# Patient Record
Sex: FEMALE | Race: WHITE | NOT HISPANIC OR LATINO | Employment: FULL TIME | URBAN - METROPOLITAN AREA
[De-identification: names, ages, dates, MRNs, and addresses within clinical notes are randomized per-mention and may not be internally consistent; named-entity substitution may affect disease eponyms.]

---

## 2017-06-04 ENCOUNTER — OFFICE VISIT (OUTPATIENT)
Dept: URGENT CARE | Facility: URGENT CARE | Age: 47
End: 2017-06-04
Payer: COMMERCIAL

## 2017-06-04 VITALS
HEART RATE: 79 BPM | OXYGEN SATURATION: 96 % | BODY MASS INDEX: 17.93 KG/M2 | RESPIRATION RATE: 15 BRPM | TEMPERATURE: 98.5 F | HEIGHT: 64 IN | DIASTOLIC BLOOD PRESSURE: 56 MMHG | WEIGHT: 105 LBS | SYSTOLIC BLOOD PRESSURE: 90 MMHG
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
## 2017-06-04 PROCEDURE — 99213 OFFICE O/P EST LOW 20 MIN: CPT | Performed by: PHYSICIAN ASSISTANT

## 2017-06-04 NOTE — PROGRESS NOTES
"CHIEF COMPLAINT:   Chief Complaint   Patient presents with     Urgent Care     Cat Bite     bit by her cat last night when she accidentally stepped on it. Left ankle/foot. A lot of pain last night.        HPI: Tasneem Mccollum is a 46 year old female who presents to clinic today for evaluation of a cat bite and scratch. Patient states that last night she accidentally stepped on her cat and her cat scratched the back of her ankle and one of the cats teeth penetrated to the top of the foot. She was in a lot of pain last night, which she states has improved after taking IBU. The cat is UTD on vaccinations. Patients last tetanus was in 2008.    Past Medical History:   Diagnosis Date     Bone disorder     Low bone mass for age     Menarche 15 years old     Past Surgical History:   Procedure Laterality Date     LEEP TX, CERVICAL  1997     Social History   Substance Use Topics     Smoking status: Never Smoker     Smokeless tobacco: Never Used     Alcohol use Yes      Comment: occ     Current Outpatient Prescriptions   Medication     amoxicillin-clavulanate (AUGMENTIN) 875-125 MG per tablet     guaiFENesin-codeine (ROBITUSSIN AC) 100-10 MG/5ML SOLN solution     fluticasone (FLONASE) 50 MCG/ACT nasal spray     Pseudoephedrine HCl (SUDAFED PO)     acetaminophen (TYLENOL) 325 MG tablet     Ibuprofen (ADVIL) 200 MG capsule     No current facility-administered medications for this visit.      Allergies   Allergen Reactions     Dust Mites      Nkda [No Known Drug Allergies]        10 point ROS of systems including Constitutional, Eyes, Respiratory, Cardiovascular, Gastroenterology, Genitourinary, Integumentary, Muscularskeletal, Psychiatric were all negative except for pertinent positives noted in my HPI.        Exam:  BP 90/56  Pulse 79  Temp 98.5  F (36.9  C) (Oral)  Resp 15  Ht 5' 4\" (1.626 m)  Wt 105 lb (47.6 kg)  LMP 05/14/2017  SpO2 96%  BMI 18.02 kg/m2  Constitutional: healthy, alert and no distress  Head: " Normocephalic, atraumatic.  M/S: Strength intact with FROM.   Skin: Two superficial scratches noted at the posterior ankle without redness, swelling, tenderness or drainage. One ~1mm bite noted on dorsal aspect of foot without erythema, swelling. TTP.   Neurologic: Speech clear, gait normal. Moves all extremities.        ASSESSMENT/PLAN:    ICD-10-CM    1. Cat bite, initial encounter W55.01XA amoxicillin-clavulanate (AUGMENTIN) 875-125 MG per tablet       I have discussed the patient's diagnosis and my plan of treatment with the patient. We went over any labs or imaging. Patient is aware to come back in with worsening symptoms or if no relief despite treatment plan.  Patient verbalizes understanding. All questions were addressed and answered.   Kaylie Adams PA-C

## 2017-06-04 NOTE — PATIENT INSTRUCTIONS
Follow up with Dr. Mugnuia if symptoms worsen or redness / drainage appear.   Cat Bite  A cat bite can cause a wound deep enough to break the skin. In such cases, the wound is cleaned and then closed. Sometimes, the wound is not closed completely. This is so that fluid can drain if the wound becomes infected. In addition to wound care, a tetanus shot may be given, if needed.  Home Care    Wash your hands well with soap and warm water before and after caring for the wound. This helps lower the risk of infection.    Care for the wound as directed. If a dressing was applied to the wound, be sure to change it as directed.    If the wound bleeds, place a clean, soft cloth on the wound. Then firmly apply pressure until the bleeding stops. This may take up to 5 minutes. Do not release the pressure and look at the wound during this time.    Most wounds heal within 10 days. But an infection can occur even with proper treatment. So be sure to check the wound daily for signs of infection (see below).    Antibiotics may be prescribed. These help prevent or treat infection. If you re given antibiotics, take them as directed. Also be sure to complete the medications.  Rabies Prevention   Rabies is a virus that can be carried in certain animals. These can include domestic animals such as cats and dogs. Pets fully vaccinated against rabies (2 shots) are at very low risk of infection. But because human rabies is almost always fatal, any biting pet should be confined for 10 days as an extra precaution. In general, if there is a risk for rabies, the following steps may need to be taken:    If someone s pet cat has bitten you, it should be kept in a secure area for the next 10 days to watch for signs of illness. (If the pet owner won t allow this, contact your local animal control center.) If the cat becomes ill or dies during that time, contact your local animal control center at once so the animal may be tested for rabies. If the  cat stays healthy for the next 10 days, there is no danger of rabies in the animal or you.    If a stray cat bit you, contact your local animal control center. They can give information on capture, quarantine, and animal rabies testing.    If you can t locate the animal that bit you in the next 2 days, and if rabies exists in your region, you may need to receive the rabies vaccine series. Call your health care provider right away. Or, return to the emergency department promptly.    All animal bites should be reported to the local animal control center. If you were not given a form to fill out, you can report this yourself.  Follow-up care  Follow up with your health care provider, or as directed.  When to seek medical advice  Call your health care provider right away if any of these occur:    Signs of infection:    Spreading redness or warmth from the wound    Increased pain or swelling    Fever of 100.4 F (38 C) or higher, or as directed by your health care provider    Colored fluid or pus draining from the wound    Signs of rabies infection:    Headache    Confusion    Strange behavior    Seizure    Decreased ability to move any body part near the bite area    Bleeding that cannot be stopped after 5 minutes of firm pressure              8295-7032 [employer] [address] [place] [address], [place] [address]. All rights reserved. This information is not intended as a substitute for professional medical care. Always follow your healthcare professional's instructions.

## 2017-06-04 NOTE — NURSING NOTE
"Chief Complaint   Patient presents with     Urgent Care     Cat Bite     bit by her cat last night when she accidentally stepped on it. Left ankle/foot. A lot of pain last night.        Initial BP 90/56  Pulse 79  Temp 98.5  F (36.9  C) (Oral)  Resp 15  Ht 5' 4\" (1.626 m)  Wt 105 lb (47.6 kg)  LMP 05/14/2017  SpO2 96%  BMI 18.02 kg/m2 Estimated body mass index is 18.02 kg/(m^2) as calculated from the following:    Height as of this encounter: 5' 4\" (1.626 m).    Weight as of this encounter: 105 lb (47.6 kg).  Medication Reconciliation: complete  "

## 2017-06-04 NOTE — MR AVS SNAPSHOT
After Visit Summary   6/4/2017    Tasneem Mccollum    MRN: [id number]           Patient Information     Date Of Birth          1970        Visit Information        Provider Department      6/4/2017 11:50 AM Kaylie Adams PA-C [facility]        Today's Diagnoses     Cat bite, initial encounter    -  1      Care Instructions      Follow up with Dr. Munguia if symptoms worsen or redness / drainage appear.   Cat Bite  A cat bite can cause a wound deep enough to break the skin. In such cases, the wound is cleaned and then closed. Sometimes, the wound is not closed completely. This is so that fluid can drain if the wound becomes infected. In addition to wound care, a tetanus shot may be given, if needed.  Home Care    Wash your hands well with soap and warm water before and after caring for the wound. This helps lower the risk of infection.    Care for the wound as directed. If a dressing was applied to the wound, be sure to change it as directed.    If the wound bleeds, place a clean, soft cloth on the wound. Then firmly apply pressure until the bleeding stops. This may take up to 5 minutes. Do not release the pressure and look at the wound during this time.    Most wounds heal within 10 days. But an infection can occur even with proper treatment. So be sure to check the wound daily for signs of infection (see below).    Antibiotics may be prescribed. These help prevent or treat infection. If you re given antibiotics, take them as directed. Also be sure to complete the medications.  Rabies Prevention   Rabies is a virus that can be carried in certain animals. These can include domestic animals such as cats and dogs. Pets fully vaccinated against rabies (2 shots) are at very low risk of infection. But because human rabies is almost always fatal, any biting pet should be confined for 10 days as an extra precaution. In general, if there is a risk for rabies, the  following steps may need to be taken:    If someone s pet cat has bitten you, it should be kept in a secure area for the next 10 days to watch for signs of illness. (If the pet owner won t allow this, contact your local animal control center.) If the cat becomes ill or dies during that time, contact your local animal control center at once so the animal may be tested for rabies. If the cat stays healthy for the next 10 days, there is no danger of rabies in the animal or you.    If a stray cat bit you, contact your local animal control center. They can give information on capture, quarantine, and animal rabies testing.    If you can t locate the animal that bit you in the next 2 days, and if rabies exists in your region, you may need to receive the rabies vaccine series. Call your health care provider right away. Or, return to the emergency department promptly.    All animal bites should be reported to the local animal control center. If you were not given a form to fill out, you can report this yourself.  Follow-up care  Follow up with your health care provider, or as directed.  When to seek medical advice  Call your health care provider right away if any of these occur:    Signs of infection:    Spreading redness or warmth from the wound    Increased pain or swelling    Fever of 100.4 F (38 C) or higher, or as directed by your health care provider    Colored fluid or pus draining from the wound    Signs of rabies infection:    Headache    Confusion    Strange behavior    Seizure    Decreased ability to move any body part near the bite area    Bleeding that cannot be stopped after 5 minutes of firm pressure              [id number] [employer]. [address] [place] [address], [place] [id number]. All rights reserved. This information is not intended as a substitute for professional medical care. Always follow your healthcare professional's instructions.                Follow-ups after your visit        Who to  "contact     If you have questions or need follow up information about today's clinic visit or your schedule please contact [facility] directly at [phone].  Normal or non-critical lab and imaging results will be communicated to you by [employer], letter or phone within 4 business days after the clinic has received the results. If you do not hear from us within 7 days, please contact the clinic through [employer] or phone. If you have a critical or abnormal lab result, we will notify you by phone as soon as possible.  Submit refill requests through [employer] or call your pharmacy and they will forward the refill request to us. Please allow 3 business days for your refill to be completed.          Additional Information About Your Visit        [employer] Information     [employer] gives you secure access to your electronic health record. If you see a primary care provider, you can also send messages to your care team and make appointments. If you have questions, please call your primary care clinic.  If you do not have a primary care provider, please call [phone] and they will assist you.        Care EveryWhere ID     This is your Care EveryWhere ID. This could be used by other organizations to access your Nursery medical records  [id number]        Your Vitals Were     Pulse Temperature Respirations Height Last Period Pulse Oximetry    79 98.5  F (36.9  C) (Oral) 15 5' 4\" (1.626 m) 05/14/2017 96%    BMI (Body Mass Index)                   18.02 kg/m2            Blood Pressure from Last 3 Encounters:   06/04/17 90/56   12/17/16 112/78   12/09/16 108/60    Weight from Last 3 Encounters:   06/04/17 105 lb (47.6 kg)   12/09/16 110 lb (49.9 kg)   12/18/15 108 lb (49 kg)              Today, you had the following     No orders found for display         Today's Medication Changes          These changes are accurate as of: 6/4/17 12:47 PM.  If you have any questions, ask your nurse or doctor.             "   Start taking these medicines.        Dose/Directions    amoxicillin-clavulanate 875-125 MG per tablet   Commonly known as:  AUGMENTIN   Used for:  Cat bite, initial encounter   Started by:  Kaylie Adams PA-C        Dose:  1 tablet   Take 1 tablet by mouth 2 times daily for 5 days   Quantity:  10 tablet   Refills:  0            Where to get your medicines      These medications were sent to [id number] - [place], [place] - [address] AT [facility]  [address], [place] [address]     Phone:  [phone]     amoxicillin-clavulanate 875-125 MG per tablet                Primary Care Provider Office Phone # Fax #    Felipe Catarino Munguia [phone]       [address] [address]        Thank you!     Thank you for choosing [facility]  for your care. Our goal is always to provide you with excellent care. Hearing back from our patients is one way we can continue to improve our services. Please take a few minutes to complete the written survey that you may receive in the mail after your visit with us. Thank you!             Your Updated Medication List - Protect others around you: Learn how to safely use, store and throw away your medicines at www.disposemymeds.org.          This list is accurate as of: 6/4/17 12:47 PM.  Always use your most recent med list.                   Brand Name Dispense Instructions for use    ADVIL 200 MG capsule   Generic drug:  ibuprofen      Take 200 mg by mouth every 4 hours as needed.       amoxicillin-clavulanate 875-125 MG per tablet    AUGMENTIN    10 tablet    Take 1 tablet by mouth 2 times daily for 5 days       fluticasone 50 MCG/ACT spray    FLONASE    16 g    Spray 1-2 sprays into both nostrils daily Should be seen before further refill.       guaiFENesin-codeine 100-10 MG/5ML Soln solution    ROBITUSSIN AC    120 mL    Take 10 mLs by mouth every 4 hours  as needed for cough       SUDAFED PO          TYLENOL 325 MG tablet   Generic drug:  acetaminophen      Take 1-2 tablets by mouth every 6 hours as needed. As needed .

## 2017-07-01 ENCOUNTER — VIRTUAL VISIT (OUTPATIENT)
Dept: FAMILY MEDICINE | Facility: OTHER | Age: 47
End: 2017-07-01

## 2017-07-01 NOTE — PROGRESS NOTES
"Date:   Clinician: Rupesh Verduzco  Clinician NPI: [id number]  Patient: Tasneem Mccollum  Patient : 1970  Patient Address: [facility], [place] [address]  Patient Phone: [phone]  Visit Protocol: URI  Patient Summary:  Tasneem is a 47 year old ( : 1970 ) female who initiated a Visit for a presumed sinus infection. When asked the question \"Please sign me up to receive news, health information and promotions. \", Tasneem responded \"No\".     Her  symptoms started gradually 2 weeks ago  and consist of rhinitis, hoarse voice, nasal congestion, post-nasal drainage, malaise, chills, loss of appetite, and cough.   She denies headache, fever, dysphagia, itchy eyes, myalgias, chest pain, ear pain, sore throat, dyspnea, nausea, and vomiting. She denies a history of facial surgery.   Her profuse nasal secretions are yellow. Her moderate facial pain or pressure feels worse when bending over or leaning forward and is located on one side of her head. The facial pain or pressure started after the onset of other URI symptoms.  She has teeth pain and is confident the tooth pain is not from a cavity, recent dental work or other mouth problems. Tasneem does not have a history of migraine headaches.   In the past year Tasneem has been diagnosed with one (1) episodes of sinusitis. When asked to feel her neck she reported enlarged lymph nodes. Tasneem noted that the enlarged neck lymph nodes developed AFTER the onset of upper respiratory symptoms. She cannot tell if axillary lymphadenopathy is present.   Her moderately severe (cough every 5-10 minutes) productive cough is more bothersome at night. She believes the cough is not caused by post-nasal drainage. Her cough produces unknown color of sputum.   She denies rigors.   Tasneem denies having COPD or other chronic lung disease.   Pulse: Not measured beats in 10 seconds.    Weight (in lbs): 105   She states she is not pregnant and denies breastfeeding. She is currently " menstruating.   Tasneem does not smoke or use smokeless tobacco.   Additional information as reported by the patient (free text): This started with severe pain on left side inner ear/ eustachian tube (adenoid?)  when I swallowed. On June 17th. I [place] and they did a strep test which was negative. The doctor said I had swollen glands and probably a virus.On June 23rd the pain was less but I was congested with a totally plugged left ear. It has been plugged ever since for 9 days! I am very congested. I had lots of drainage down my throat that started the cough. I'm dizzy moving around-and have no energ   MEDICATIONS:  Pseudoephedrine (Sudafed, Dimetapp), oxymetazoline (Afrin, Dristan), ibuprofen (Advil, Motrin), and loratadine (Claritin, Tavist ND)  , ALLERGIES:  NKDA   Clinician Response:  Dear Tasneem,  Based on the information you have provided, you likely have  acute sinusitis, otherwise known as a sinus infection.   I am prescribing fluticasone propionate nasal (Flonase) 50 mcg/spray. Take one or two inhalations in each nostril one time a day. There are no refills with this prescription.   I am prescribing amoxicillin 500 mg. Take two tablets by mouth three times a day for 10 days. There are no refills with this prescription.   Unless your are allergic to the over-the-counter medication(s) below, I recommend using:   A sinus irrigation kit such as Sinus Rinse, Neti Pot, SinuCleanse (or store brand). Be sure to use sterile or previously boiled water to prevent unwanted infections.   Guaifenesin + dextromethorphan (Robitussin DM, Mucinex DM). This is an over-the-counter medication that does not require a prescription.   A decongestant such as pseudoephedrine (Sudafed or store brand) to help your symptoms. This is an over-the-counter medication that does not require a prescription.   Ibuprofen. Take 1-3 200mg tablets (200-600mg) every 8 hours to help with the discomfort. Make sure to take the  ibuprofen with food. Do not exceed 2400mg in 24 hours. This is an over-the-counter medication that does not require a prescription.   Drink plenty of liquids, especially water and take time to rest your body. This may mean taking a nap or going to bed earlier. Your body is fighting an infection and liquids and rest will improve the pace of recovery. Remember to regularly wash your hands and avoid close contact with others to prevent spreading your infection.   Some people develop allergies to antibiotics. If you notice a new rash, significant swelling or difficulty breathing, stop the medication immediately and go into a clinic for physical evaluation.   Some women may also develop a yeast infection as a side effect of taking antibiotics. If you notice symptoms of a yeast infection, please use [facility] to get treatment.   If you become pregnant during this course of treatment with medication, stop taking the medication and contact your primary care provider.   Diagnosis: Acute Sinusitis  Diagnosis ICD: J01.90  Prescription: fluticasone propionate (Flonase) 50mcg nasal spray 16 gm, 30 days supply. Take one or two inhalations in each nostril one time a day. Refills: 0, Refill as needed: no, Allow substitutions: yes  Prescription: amoxicillin 500mg oral tablet 60 tablets, 10 days supply. Take two tablets by mouth three times a day for 10 days. Refills: 0, Refill as needed: no, Allow substitutions: yes  Pharmacy: [id number] - [phone] - [address] [place] [address]

## 2018-10-22 ENCOUNTER — OFFICE VISIT (OUTPATIENT)
Dept: URGENT CARE | Facility: URGENT CARE | Age: 48
End: 2018-10-22
Payer: COMMERCIAL

## 2018-10-22 VITALS
TEMPERATURE: 98.3 F | OXYGEN SATURATION: 99 % | WEIGHT: 100 LBS | HEART RATE: 91 BPM | SYSTOLIC BLOOD PRESSURE: 121 MMHG | BODY MASS INDEX: 17.07 KG/M2 | DIASTOLIC BLOOD PRESSURE: 81 MMHG | HEIGHT: 64 IN

## 2018-10-22 DIAGNOSIS — R09.81 SINUS CONGESTION: Primary | ICD-10-CM

## 2018-10-22 DIAGNOSIS — J34.89 SINUS PRESSURE: ICD-10-CM

## 2018-10-22 PROCEDURE — 99213 OFFICE O/P EST LOW 20 MIN: CPT | Performed by: PEDIATRICS

## 2018-10-22 RX ORDER — FLUTICASONE PROPIONATE 50 MCG
1-2 SPRAY, SUSPENSION (ML) NASAL DAILY
Qty: 16 G | Refills: 0 | Status: SHIPPED | OUTPATIENT
Start: 2018-10-22

## 2018-10-22 RX ORDER — CETIRIZINE HYDROCHLORIDE 10 MG/1
10 TABLET ORAL EVERY EVENING
Qty: 30 TABLET | Refills: 0 | Status: SHIPPED | OUTPATIENT
Start: 2018-10-22 | End: 2023-12-28

## 2018-10-22 NOTE — MR AVS SNAPSHOT
After Visit Summary   10/22/2018    Tasneem Mccollum    MRN: [id number]           Patient Information     Date Of Birth          1970        Visit Information        Provider Department      10/22/2018 6:20 PM Rupesh Carrillo MD [facility]        Today's Diagnoses     Sinus congestion    -  1    Sinus pressure           Follow-ups after your visit        Your next 10 appointments already scheduled     Nov 30, 2018  3:00 PM CST   Office Visit with Melvi Kinney MD   [facility] ([facility])    [address] [place] [id number] [phone]           Bring a current list of meds and any records pertaining to this visit. For Physicals, please bring immunization records and any forms needing to be filled out. Please arrive 10 minutes early to complete paperwork.              Who to contact     If you have questions or need follow up information about today's clinic visit or your schedule please contact [facility] directly at [phone].  Normal or non-critical lab and imaging results will be communicated to you by [employer], letter or phone within 4 business days after the clinic has received the results. If you do not hear from us within 7 days, please contact the clinic through [employer] or phone. If you have a critical or abnormal lab result, we will notify you by phone as soon as possible.  Submit refill requests through [employer] or call your pharmacy and they will forward the refill request to us. Please allow 3 business days for your refill to be completed.          Additional Information About Your Visit        [employer] Information     [employer] gives you secure access to your electronic health record. If you see a primary care provider, you can also send messages to your care team and make appointments. If you have questions, please call your primary care clinic.  If you do not have  "a primary care provider, please call [phone] and they will assist you.        Care EveryWhere ID     This is your Care EveryWhere ID. This could be used by other organizations to access your [facility] medical records  [id number]        Your Vitals Were     Pulse Temperature Height Last Period Pulse Oximetry BMI (Body Mass Index)    91 98.3  F (36.8  C) (Tympanic) 5' 4\" (1.626 m) 10/01/2018 99% 17.16 kg/m2       Blood Pressure from Last 3 Encounters:   10/22/18 121/81   06/04/17 90/56   12/17/16 112/78    Weight from Last 3 Encounters:   10/22/18 100 lb (45.4 kg)   06/04/17 105 lb (47.6 kg)   12/09/16 110 lb (49.9 kg)              Today, you had the following     No orders found for display         Today's Medication Changes          These changes are accurate as of 10/22/18  7:08 PM.  If you have any questions, ask your nurse or doctor.               Start taking these medicines.        Dose/Directions    cetirizine 10 MG tablet   Commonly known as:  zyrTEC   Used for:  Sinus congestion   Started by:  Rupesh Carrillo MD        Dose:  10 mg   Take 1 tablet (10 mg) by mouth every evening   Quantity:  30 tablet   Refills:  0            Where to get your medicines      These medications were sent to [id number] - [place], [place] - [address] AT [facility]  [place]  [address] [place] [address]     Phone:  [phone]     cetirizine 10 MG tablet    fluticasone 50 MCG/ACT spray                Primary Care Provider Office Phone # Fax #    Felipe Catarino Munguia [phone] [address]  [facility] [address]        Equal Access to Services     LINN CLINTON AH: Lorena Sousa, lori quiroga, yandel kahernan molina. So [facility] [phone].    ATENCIÓN: Si habla español, tiene a saha disposición servicios gratuitos de asistencia lingüística. Kade atkins [phone].    We comply with applicable federal civil " rights laws and [place] laws. We do not discriminate on the basis of race, color, national origin, age, disability, sex, sexual orientation, or gender identity.            Thank you!     Thank you for choosing [facility]  for your care. Our goal is always to provide you with excellent care. Hearing back from our patients is one way we can continue to improve our services. Please take a few minutes to complete the written survey that you may receive in the mail after your visit with us. Thank you!             Your Updated Medication List - Protect others around you: Learn how to safely use, store and throw away your medicines at www.disposemymeds.org.          This list is accurate as of 10/22/18  7:08 PM.  Always use your most recent med list.                   Brand Name Dispense Instructions for use Diagnosis    ADVIL 200 MG capsule   Generic drug:  ibuprofen      Take 200 mg by mouth every 4 hours as needed.        cetirizine 10 MG tablet    zyrTEC    30 tablet    Take 1 tablet (10 mg) by mouth every evening    Sinus congestion       fluticasone 50 MCG/ACT spray    FLONASE    16 g    Spray 1-2 sprays into both nostrils daily Should be seen before further refill.    Sinus pressure       guaiFENesin-codeine 100-10 MG/5ML Soln solution    ROBITUSSIN AC    120 mL    Take 10 mLs by mouth every 4 hours as needed for cough    Cough       SUDAFED PO           TYLENOL 325 MG tablet   Generic drug:  acetaminophen      Take 1-2 tablets by mouth every 6 hours as needed. As needed .

## 2018-10-23 NOTE — PROGRESS NOTES
"SUBJECTIVE:  Tasneem PRITCHARD Devorah Mccollum is a 48 year old female here with concerns about sinus congestion.  She states onset of symptoms were 1 month(s) ago.  She has had maxillary pressure. Course of illness is same. Severity mild  Current and Associated symptoms: No fever, rhinorrhea, cough, sore throat, ear pain. Some headache.  Predisposing factors include seasonal allergies. Recent treatment has included: OTC meds and Steroid nasal spray    Past Medical History:   Diagnosis Date     Bone disorder     Low bone mass for age     Menarche 15 years old     Social History   Substance Use Topics     Smoking status: Never Smoker     Smokeless tobacco: Never Used     Alcohol use Yes      Comment: occ       ROS:  CONSTITUTIONAL:NEGATIVE for fever, chills, change in weight  INTEGUMENTARY/SKIN: NEGATIVE for worrisome rashes, moles or lesions  RESP:NEGATIVE for significant cough or SOB  CV: NEGATIVE for chest pain, palpitations or peripheral edema  GI: NEGATIVE for nausea, abdominal pain, heartburn, or change in bowel habits    OBJECTIVE:  /81  Pulse 91  Temp 98.3  F (36.8  C) (Tympanic)  Ht 5' 4\" (1.626 m)  Wt 100 lb (45.4 kg)  LMP 10/01/2018  SpO2 99%  BMI 17.16 kg/m2  Exam:GENERAL APPEARANCE: healthy, alert and no distress  EYES: EOMI,  PERRL, conjunctiva clear  HENT: ear canals and TM's normal.  Nose and mouth without ulcers, erythema or lesions  RESP: lungs clear to auscultation - no rales, rhonchi or wheezes  SKIN: no suspicious lesions or rashes    ASSESSMENT:  Seasonal allergies  See diagnosis    PLAN:  See orders  Follow up with primary clinic if not improving    "

## 2019-10-01 ENCOUNTER — HEALTH MAINTENANCE LETTER (OUTPATIENT)
Age: 49
End: 2019-10-01

## 2019-12-19 ENCOUNTER — VIRTUAL VISIT (OUTPATIENT)
Dept: FAMILY MEDICINE | Facility: OTHER | Age: 49
End: 2019-12-19

## 2019-12-19 NOTE — PROGRESS NOTES
"Date: 2019 14:36:36  Clinician: Joel Anglin  Clinician NPI: [id number]  Patient: Tasneem Mccollum  Patient : 1970  Patient Address: [address] [place] [address]  Patient Phone: [phone]  Visit Protocol: URI  Patient Summary:  Tasneem is a 49 year old ( : 1970 ) female who initiated a Visit for cold, sinus infection, or influenza. When asked the question \"Please sign me up to receive news, health information and promotions. \", Tasneem responded \"No\".    Tasneem states her symptoms started gradually 2-3 weeks ago. After her symptoms started, they improved and then got worse again.   Her symptoms consist of a headache, nasal congestion, malaise, rhinitis, tooth pain, a cough, and facial pain or pressure. She is experiencing difficulty breathing due to nasal congestion but she is not short of breath.   Symptom details     Nasal secretions: The color of her mucus is yellow.    Cough: Tasneem coughs every 5-10 minutes and her cough is more bothersome at night. Phlegm comes into her throat when she coughs. She believes the phlegm causes the cough. The color of the phlegm is clear.     Facial pain or pressure: The facial pain or pressure feels worse when bending over or leaning forward.     Headache: She states the headache is severe (7-9 on a 10 point pain scale).     Tooth pain: The tooth pain is not caused by a cavity, recent dental work, or other mouth problems.      Tasneem denies having sore throat, ear pain, chills, fever, wheezing, and myalgias. She also denies taking antibiotic medication for the symptoms, having recent facial or sinus surgery in the past 60 days, and having a sinus infection within the past year.   Precipitating events  She has not recently been exposed to someone with influenza. Tasneem has not been in close contact with any high risk individuals.   Pertinent medical history  Tasneem does not get yeast infections when she takes antibiotics.   Weight: 105 lbs   Tasneem does not smoke " or use smokeless tobacco.   She denies pregnancy and denies breastfeeding. She is currently menstruating.   Weight: 105 lbs    MEDICATIONS: Advil oral, ALLERGIES: pseudoephedrine  Clinician Response:  Dear Tasneem,  Based on the information provided, you have acute bacterial sinusitis, also known as a sinus infection. Sinus infections are caused by bacteria or a virus and symptoms are almost always identical. The difference between the 2 types of infections is timing.  Sinus infections start as viral infections and symptoms improve on their own in about 7 days. If symptoms have not improved after 7 days or have even worsened, a bacterial infection may have developed.  Medication information  I am prescribing:       Azithromycin (Zithromax Z-Moisés) 250 mg oral tablet. Take 2 tablets by mouth on day 1, then 1 tablet by mouth on days 2-5. There are no refills with this prescription.      Benzonatate (Tessalon Perles) 100 mg oral capsule. Take 1-2 capsules by mouth 3 times per day as needed for your cough. There are no refills with this prescription.     Yeast infections can be a common side effect of antibiotics. The most common symptom of a yeast infection is itchiness in and around the vagina. Other signs and symptoms include burning, redness, or a thick, white vaginal discharge that looks like cottage cheese and does not have a bad smell.  If you become pregnant during this course of treatment, stop taking the medication and contact your primary care provider.  Self care  The following tips will keep you as comfortable as possible while you recover:     Rest    Drink plenty of water and other liquids    Take a hot shower to loosen congestion    Take a spoonful of honey to reduce your cough     When to seek care  Please be seen in a clinic or urgent care if any of the following occur:     Symptoms do not start to improve after 3 days of treatment    New symptoms develop, or symptoms become worse     It is possible to  have an allergic reaction to an antibiotic even if you have not had one in the past. If you notice a new rash, significant swelling, or difficulty breathing, stop taking this medication immediately and go to a clinic or urgent care.   Diagnosis: Acute bacterial sinusitis  Diagnosis ICD: J01.90  Prescription: azithromycin (Zithromax Z-Moisés) 250 mg oral tablet 6 tablet, 5 days supply. Take 2 tablets by mouth on day 1, then 1 tablet by mouth on days 2-5. Refills: 0, Refill as needed: no, Allow substitutions: yes  Prescription: benzonatate (Tessalon Perles) 100 mg oral capsule 30 capsule, 5 days supply. Take 1-2 capsules by mouth 3 times per day as needed. Refills: 0, Refill as needed: no, Allow substitutions: yes  Pharmacy: [facility]  [phone] - [address] [place] [id number]

## 2020-01-15 ENCOUNTER — OFFICE VISIT (OUTPATIENT)
Dept: FAMILY MEDICINE | Facility: CLINIC | Age: 50
End: 2020-01-15
Payer: COMMERCIAL

## 2020-01-15 VITALS
RESPIRATION RATE: 16 BRPM | BODY MASS INDEX: 18.57 KG/M2 | WEIGHT: 108.75 LBS | HEART RATE: 90 BPM | TEMPERATURE: 98.3 F | SYSTOLIC BLOOD PRESSURE: 108 MMHG | DIASTOLIC BLOOD PRESSURE: 72 MMHG | HEIGHT: 64 IN | OXYGEN SATURATION: 99 %

## 2020-01-15 DIAGNOSIS — J01.01 ACUTE RECURRENT MAXILLARY SINUSITIS: Primary | ICD-10-CM

## 2020-01-15 PROCEDURE — 90471 IMMUNIZATION ADMIN: CPT | Performed by: FAMILY MEDICINE

## 2020-01-15 PROCEDURE — 90686 IIV4 VACC NO PRSV 0.5 ML IM: CPT | Performed by: FAMILY MEDICINE

## 2020-01-15 PROCEDURE — 99213 OFFICE O/P EST LOW 20 MIN: CPT | Mod: 25 | Performed by: FAMILY MEDICINE

## 2020-01-15 ASSESSMENT — MIFFLIN-ST. JEOR: SCORE: 1095.35

## 2020-01-15 NOTE — PROGRESS NOTES
"Subjective     Tasneem FRANCHESKA Mccollum is a 49 year old female who presents to clinic today for the following health issues:    HPI   Ear problem      Duration: had URI sx, but has gone away now.     Description (location/character/radiation): left ear plugged, occasional pain radiating down throat, muffled hearing, fatigue    Intensity:  mild    Accompanying signs and symptoms: pain and discomfort on right side of rib.     History (similar episodes/previous evaluation): None    Precipitating or alleviating factors: None    Therapies tried and outcome: sudafed and cleaning out ears in shower, humidifier and ibuprofen, had z-zach for uri sx     Did oncare and got zpack.    Left side of head, left ear muffled.   Balance is dizzy.   Pressure on left side.   Had some coughing but now better. Still lungs are not back to baseline.   zpack helpes but did not make it go away completely.   In the morning - some stomach discomfort.       Social History     Occupational History     Not on file   Tobacco Use     Smoking status: Never Smoker     Smokeless tobacco: Never Used   Substance and Sexual Activity     Alcohol use: Yes     Comment: occ     Drug use: No     Sexual activity: Yes     Partners: Male     Birth control/protection: Condom, Spermicide     Comment: spermicidal/upcoming vasectomy     Allergies   Allergen Reactions     Dust Mites      Nkda [No Known Drug Allergies]      Patient Active Problem List   Diagnosis     CARDIOVASCULAR SCREENING; LDL GOAL LESS THAN 160     S/P LEEP of cervix     Seafood allergy     Cyst of left ovary     Reviewed medications, social history and  past medical and surgical history.    Review of system: for general, respiratory, CVS, GI and psychiatry negative except for noted above.     EXAM:  /72 (BP Location: Left arm, Patient Position: Sitting, Cuff Size: Adult Regular)   Pulse 90   Temp 98.3  F (36.8  C) (Oral)   Resp 16   Ht 1.613 m (5' 3.5\")   Wt 49.3 kg (108 lb 12 oz)   LMP " 01/11/2020 (Approximate)   SpO2 99%   BMI 18.96 kg/m    Constitutional: healthy, alert and no distress   Psychiatric: mentation appears normal and affect normal/bright  Cardiovascular: RRR. No murmurs,  Respiratory: negative, Lungs clear. No crackles or wheezing. No tachypnea.   Head: Left more than the right frontal and maxillary sinus tenderness.  ENT: Both TM exam normal, minimal cervical adenopathy, throat clear    ASSESSMENT / PLAN:  (J01.01) Acute recurrent maxillary sinusitis  (primary encounter diagnosis)  Comment: Was treated  Via oncare with Z-Moisés.  Symptoms did not improve significantly.  We will do Augmentin.  She has tolerated very well in the past.  She understands diarrhea can be side effects.   .  Home care discussed.  Plan: amoxicillin-clavulanate (AUGMENTIN) 875-125 MG         tablet          The above note was dictated using voice recognition. Although reviewed after completion, some word and grammatical error may remain .

## 2020-01-23 ENCOUNTER — TELEPHONE (OUTPATIENT)
Dept: FAMILY MEDICINE | Facility: CLINIC | Age: 50
End: 2020-01-23

## 2020-01-23 NOTE — TELEPHONE ENCOUNTER
Called pt new to [facility], is on schedule for Subconjunctival hemorrhage     Spoke with pt and ok to be seen tomorrow. Slightly blurry vision but no dark spots or loss of peripheral vision. Says it is spreading.     Doesn't know of any injury that may have caused broken blood vessel. No headaches or any other new symptoms. No sever coughing or sneezing. Doesn't have an ophthalmologist, did recommend she establish care with one and get a thorough exam. In the mean time should come in and have provider check it out.    Kat Pastrana RN

## 2020-01-24 ENCOUNTER — OFFICE VISIT (OUTPATIENT)
Dept: FAMILY MEDICINE | Facility: CLINIC | Age: 50
End: 2020-01-24
Payer: COMMERCIAL

## 2020-01-24 ENCOUNTER — APPOINTMENT (OUTPATIENT)
Dept: GENERAL RADIOLOGY | Facility: CLINIC | Age: 50
End: 2020-01-24
Attending: EMERGENCY MEDICINE
Payer: COMMERCIAL

## 2020-01-24 ENCOUNTER — HOSPITAL ENCOUNTER (EMERGENCY)
Facility: CLINIC | Age: 50
Discharge: HOME | End: 2020-01-24
Attending: EMERGENCY MEDICINE | Admitting: EMERGENCY MEDICINE
Payer: COMMERCIAL

## 2020-01-24 VITALS
RESPIRATION RATE: 15 BRPM | DIASTOLIC BLOOD PRESSURE: 73 MMHG | HEART RATE: 80 BPM | SYSTOLIC BLOOD PRESSURE: 123 MMHG | OXYGEN SATURATION: 100 % | TEMPERATURE: 98.7 F | WEIGHT: 110 LBS | HEIGHT: 64 IN | BODY MASS INDEX: 18.78 KG/M2

## 2020-01-24 VITALS
OXYGEN SATURATION: 100 % | HEIGHT: 64 IN | RESPIRATION RATE: 14 BRPM | SYSTOLIC BLOOD PRESSURE: 118 MMHG | DIASTOLIC BLOOD PRESSURE: 75 MMHG | WEIGHT: 110 LBS | BODY MASS INDEX: 18.78 KG/M2 | HEART RATE: 94 BPM

## 2020-01-24 DIAGNOSIS — H11.32 SUBCONJUNCTIVAL HEMORRHAGE OF LEFT EYE: ICD-10-CM

## 2020-01-24 DIAGNOSIS — J01.00 ACUTE MAXILLARY SINUSITIS, RECURRENCE NOT SPECIFIED: ICD-10-CM

## 2020-01-24 DIAGNOSIS — J06.9 UPPER RESPIRATORY TRACT INFECTION, UNSPECIFIED TYPE: ICD-10-CM

## 2020-01-24 DIAGNOSIS — R53.83 FATIGUE, UNSPECIFIED TYPE: ICD-10-CM

## 2020-01-24 DIAGNOSIS — R07.9 CHEST PAIN, UNSPECIFIED TYPE: ICD-10-CM

## 2020-01-24 DIAGNOSIS — R07.89 CHEST DISCOMFORT: Primary | ICD-10-CM

## 2020-01-24 LAB
ANION GAP SERPL CALCULATED.3IONS-SCNC: 5 MMOL/L (ref 3–14)
BASOPHILS # BLD AUTO: 0.1 10E9/L (ref 0–0.2)
BASOPHILS NFR BLD AUTO: 0.7 %
BUN SERPL-MCNC: 15 MG/DL (ref 7–30)
CALCIUM SERPL-MCNC: 8.6 MG/DL (ref 8.5–10.1)
CHLORIDE SERPL-SCNC: 105 MMOL/L (ref 94–109)
CO2 SERPL-SCNC: 28 MMOL/L (ref 20–32)
CREAT SERPL-MCNC: 0.65 MG/DL (ref 0.52–1.04)
DIFFERENTIAL METHOD BLD: ABNORMAL
EOSINOPHIL # BLD AUTO: 0.1 10E9/L (ref 0–0.7)
EOSINOPHIL NFR BLD AUTO: 1 %
ERYTHROCYTE [DISTWIDTH] IN BLOOD BY AUTOMATED COUNT: 16.3 % (ref 10–15)
GFR SERPL CREATININE-BSD FRML MDRD: >90 ML/MIN/{1.73_M2}
GLUCOSE SERPL-MCNC: 102 MG/DL (ref 70–99)
HCT VFR BLD AUTO: 36.4 % (ref 35–47)
HGB BLD-MCNC: 11.2 G/DL (ref 11.7–15.7)
IMM GRANULOCYTES # BLD: 0 10E9/L (ref 0–0.4)
IMM GRANULOCYTES NFR BLD: 0.1 %
INTERPRETATION ECG - MUSE: NORMAL
LYMPHOCYTES # BLD AUTO: 1.7 10E9/L (ref 0.8–5.3)
LYMPHOCYTES NFR BLD AUTO: 24.1 %
MCH RBC QN AUTO: 23.7 PG (ref 26.5–33)
MCHC RBC AUTO-ENTMCNC: 30.8 G/DL (ref 31.5–36.5)
MCV RBC AUTO: 77 FL (ref 78–100)
MONOCYTES # BLD AUTO: 0.7 10E9/L (ref 0–1.3)
MONOCYTES NFR BLD AUTO: 9.4 %
NEUTROPHILS # BLD AUTO: 4.5 10E9/L (ref 1.6–8.3)
NEUTROPHILS NFR BLD AUTO: 64.7 %
NRBC # BLD AUTO: 0 10*3/UL
NRBC BLD AUTO-RTO: 0 /100
PLATELET # BLD AUTO: 395 10E9/L (ref 150–450)
POTASSIUM SERPL-SCNC: 3.5 MMOL/L (ref 3.4–5.3)
RBC # BLD AUTO: 4.72 10E12/L (ref 3.8–5.2)
SODIUM SERPL-SCNC: 138 MMOL/L (ref 133–144)
TROPONIN I SERPL-MCNC: <0.015 UG/L (ref 0–0.04)
WBC # BLD AUTO: 7 10E9/L (ref 4–11)

## 2020-01-24 PROCEDURE — 99285 EMERGENCY DEPT VISIT HI MDM: CPT | Mod: 25

## 2020-01-24 PROCEDURE — 93000 ELECTROCARDIOGRAM COMPLETE: CPT | Performed by: FAMILY MEDICINE

## 2020-01-24 PROCEDURE — 84484 ASSAY OF TROPONIN QUANT: CPT | Performed by: EMERGENCY MEDICINE

## 2020-01-24 PROCEDURE — 80048 BASIC METABOLIC PNL TOTAL CA: CPT | Performed by: EMERGENCY MEDICINE

## 2020-01-24 PROCEDURE — 99215 OFFICE O/P EST HI 40 MIN: CPT | Performed by: FAMILY MEDICINE

## 2020-01-24 PROCEDURE — 93005 ELECTROCARDIOGRAM TRACING: CPT

## 2020-01-24 PROCEDURE — 85025 COMPLETE CBC W/AUTO DIFF WBC: CPT | Performed by: EMERGENCY MEDICINE

## 2020-01-24 PROCEDURE — 71046 X-RAY EXAM CHEST 2 VIEWS: CPT

## 2020-01-24 ASSESSMENT — MIFFLIN-ST. JEOR
SCORE: 1108.96
SCORE: 1108.96

## 2020-01-24 ASSESSMENT — ENCOUNTER SYMPTOMS
EYE REDNESS: 1
VOMITING: 0
FATIGUE: 1
NUMBNESS: 0
HEADACHES: 0
WEAKNESS: 0
SPEECH DIFFICULTY: 0

## 2020-01-24 NOTE — PROGRESS NOTES
Subjective     Tasneem FRANCHESKA Devorah Mccollum is a 49 year old female who presents to clinic today for the following health issues:    HPI   1) sub conjuctival hemorrhage on the left eye medially , for the past two days Eye(s) Problem- subconjunctival hemorrhage on the left eye medially       2Duration: 2 days- she had URI with cough prior to this but most recently no coughing , no fever , with a sinus infection , started Augmentin on Jan 16th    Description:  Location: left  Pain: no  Redness: YES  Discharge: YES    2)sinusitis -Accompanying signs and symptoms: was started on Abx for sinusitis on Jan 16th - Augmentin , she still has anosmia, some pressure in the left ear , dizzy when rotating moving head sideways , maxillary pressure she has been taking the Augmentin for 8 days now and does not think she has any improvement , no fever no chills     History (Trauma, foreign body exposure,): None    Precipitating or alleviating factors (contact use): None    Therapies tried and outcome: eyedrops    2) some pain in between shoulder blades but more of a discomfort , no wheezing , she is not coughing , yesterday felt very lightheaded and fatigued at work and pain was worse than it is today  She has had some symptoms like this for about ten days on and off , got worse yesterday at work. She is not a smoker , no hx of DM, or HTN, she does have FH of CAD on dad's side     Patient Active Problem List   Diagnosis     CARDIOVASCULAR SCREENING; LDL GOAL LESS THAN 160     S/P LEEP of cervix     Seafood allergy     Cyst of left ovary     Past Surgical History:   Procedure Laterality Date     LEEP TX, CERVICAL  1997       Social History     Tobacco Use     Smoking status: Never Smoker     Smokeless tobacco: Never Used   Substance Use Topics     Alcohol use: Yes     Comment: occ     Family History   Problem Relation Age of Onset     Thyroid Disease Father      Lipids Father      Hypertension Father          Current Outpatient Medications    Medication Sig Dispense Refill     acetaminophen (TYLENOL) 325 MG tablet Take 1-2 tablets by mouth every 6 hours as needed. As needed .       amoxicillin-clavulanate (AUGMENTIN) 875-125 MG tablet Take 1 tablet by mouth 2 times daily for 10 days 20 tablet 0     cetirizine (ZYRTEC) 10 MG tablet Take 1 tablet (10 mg) by mouth every evening 30 tablet 0     fluticasone (FLONASE) 50 MCG/ACT spray Spray 1-2 sprays into both nostrils daily Should be seen before further refill. 16 g 0     Ibuprofen (ADVIL) 200 MG capsule Take 200 mg by mouth every 4 hours as needed.       Pseudoephedrine HCl (SUDAFED PO)        Allergies   Allergen Reactions     Dust Mites      Nkda [No Known Drug Allergies]      Recent Labs   Lab Test 01/24/20  1823 05/27/14  1200   A1C  --  5.6   HDL  --  69   CR 0.65  --    GFRESTIMATED >90  --    GFRESTBLACK >90  --    POTASSIUM 3.5  --    TSH  --  1.97      BP Readings from Last 3 Encounters:   01/24/20 123/73   01/24/20 118/75   01/15/20 108/72    Wt Readings from Last 3 Encounters:   01/24/20 49.9 kg (110 lb)   01/24/20 49.9 kg (110 lb)   01/15/20 49.3 kg (108 lb 12 oz)                      Reviewed and updated as needed this visit by Provider         Review of Systems   ROS COMP: Constitutional, HEENT, cardiovascular, pulmonary, GI, , musculoskeletal, neuro, skin, endocrine and psych systems are negative, except as otherwise noted.      Objective    LMP 01/11/2020 (Approximate)   There is no height or weight on file to calculate BMI.  Physical Exam   GENERAL: healthy, alert and no distress  EYES: conjunctiva/corneas- subconjunctival hemorrhage left eye- medially   HENT: ear canals and TM's normal, nose and mouth without ulcers or lesions  NECK: no adenopathy, no asymmetry, masses, or scars and thyroid normal to palpation  RESP: lungs clear to auscultation - no rales, rhonchi or wheezes  CV: regular rate and rhythm, normal S1 S2, no S3 or S4, no murmur, click or rub, no peripheral edema and  peripheral pulses strong  ABDOMEN: soft, nontender, no hepatosplenomegaly, no masses and bowel sounds normal  MS: no gross musculoskeletal defects noted, no edema  NEURO: Normal strength and tone, mentation intact and speech normal    Diagnostic Test Results:  EKG   Labs reviewed in Epic  No results found for this or any previous visit (from the past 24 hour(s)).        Assessment & Plan     1. Chest discomfort  Since she does have ST elevation in leads V1, V2 and V3 and also peaked T waves in same leads , significantly changed form her EKG in 2011, also in view of her chest discomfort , back pain in between shoulder blades ,  I have advised and sent pt to the ER. We called ambulance which pt initially refused but after  took a while to come , then she was transported to ER via ambulance, she did receive some O2 while waiting in clinic .  - EKG 12-lead complete w/read - Clinics       (H11.32) Subconjunctival hemorrhage of left eye  Comment: will monitor   Plan: avoid straining , bending down etc , rest for now     (J01.00) Acute maxillary sinusitis, recurrence not specified  Comment: improved only slightly after 7 days of Abx   Plan: after ruling out her cardiac symptoms , we can reassess the sinus infection, seems just a bit better with the Augmentin but not much , we considered doing CT scan of the sinuses if not better after completing the course .     (R53.83) Fatigue, unspecified type  Comment: see above   Plan: as above         Katie Roa MD  [facility]

## 2020-01-24 NOTE — ED AVS SNAPSHOT
Emergency Department  [address] [facility] [address]  Phone:  [phone]  Fax:  [phone]                                    Tasneem Mccollum   MRN: [id number]    Department:   Emergency Department   Date of Visit:  1/24/2020           After Visit Summary Signature Page    I have received my discharge instructions, and my questions have been answered. I have discussed any challenges I see with this plan with the nurse or doctor.    ..........................................................................................................................................  Patient/Patient Representative Signature      ..........................................................................................................................................  Patient Representative Print Name and Relationship to Patient    ..................................................               ................................................  Date                                   Time    ..........................................................................................................................................  Reviewed by Signature/Title    ...................................................              ..............................................  Date                                               Time          22EPIC Rev 08/18

## 2020-01-25 NOTE — DISCHARGE INSTRUCTIONS
I have put in a request for stress test and the cardiology clinic should be calling you.  I would recommend taking a baby aspirin until after stress test.  Please return here if you have any worsening chest discomfort especially with exertion.  Discharge Instructions  Chest Pain    You have been seen today for chest pain or discomfort.  At this time, your provider has found no signs that your chest pain is due to a serious or life-threatening condition, (or you have declined more testing and/or admission to the hospital). However, sometimes there is a serious problem that does not show up right away. Your evaluation today may not be complete and you may need further testing and evaluation.     Generally, every Emergency Department visit should have a follow-up clinic visit with either a primary or a specialty clinic/provider. Please follow-up as instructed by your emergency provider today.  Return to the Emergency Department if:  Your chest pain changes, gets worse, starts to happen more often, or comes with less activity.  You are newly short of breath.  You get very weak or tired.  You pass out or faint.  You have any new symptoms, like fever, cough, numb legs, or you cough up blood.  You have anything else that worries you.    Until you follow-up with your regular provider, please do the following:  Take one aspirin daily unless you have an allergy or are told not to by your provider.  If a stress test appointment has been made, go to the appointment.  If you have questions, contact your regular provider.  Follow-up with your regular provider/clinic as directed; this is very important.    If you were given a prescription for medicine here today, be sure to read all of the information (including the package insert) that comes with your prescription.  This will include important information about the medicine, its side effects, and any warnings that you need to know about.  The pharmacist who fills the prescription  can provide more information and answer questions you may have about the medicine.  If you have questions or concerns that the pharmacist cannot address, please call or return to the Emergency Department.       Remember that you can always come back to the Emergency Department if you are not able to see your regular provider in the amount of time listed above, if you get any new symptoms, or if there is anything that worries you.

## 2020-01-25 NOTE — ED NOTES
Bed: ED11  Expected date:   Expected time:   Means of arrival:   Comments:  429 49f right bundle block ETA now

## 2020-01-25 NOTE — ED TRIAGE NOTES
Intermittent left sided chest with deep breath and pain between shoulder blades over last 4 days and symptoms gotten worse last 2 days. Went to clinic had EKG change showed LBBB from baseline.

## 2020-01-25 NOTE — ED PROVIDER NOTES
History     Chief Complaint:  Abnormal Ekg and Chest Pain      HPI   Tasneem Mccollum is a 49 year old female who presents with her  via EMS for evaluation of chest pain and an abnormal EKG. Patient reports having an episode of chest pressure pain two days ago that lasted an hour along with aching in between her shoulder blades. She also reports feeling off balance when bending over or turning her head recently along with an inability to taste or smell as well as increased fatigue. Patient notes that she has had a sinus infection recently that she finished a course of azithromycin for and is currently on day 7 of a 10 day course of Augmentin for her balance issues. Patient presented to [facility] today due to redness in her left eye. While evaluated there she noted the episode and chest pain and they did an EKG that showed new bundle branch block and was referred here. . She denies congestion, double vision, speech changes, numbness, weakness, headache, vomiting, use of hormones, recent travel or surgery. . Of note patient states that her father's side has a history of heart attacks and diabetes.She denies HTN, DM, HDL,, family history of clot.     Allergies:  No known drug allergies    Medications:    amoxicillin-clavulanate   cetirizine   fluticasone  Pseudoephedrine     Past Medical History:    Bone disorder  Menarche   Left ovarian cyst    Past Surgical History:    LEEP Tx, cervical    Family History:    Thyroid disease - father  Lipids - father  HTN - father    Social History:  Smoking status: never smoker  Alcohol use: yes  Drug use: no  The patient presents to the emergency department with her , [place].  PCP: Felipe Munguia  Marital Status:     Primary: [facility]  Last period a week ago  Patient is sexually active and uses condoms.  Patient has two daughters.  Patient works as a teacher.    Review of Systems   Constitutional: Positive for fatigue.  "  HENT: Negative for congestion.    Eyes: Positive for redness. Negative for visual disturbance.   Cardiovascular: Positive for chest pain.   Gastrointestinal: Negative for vomiting.   Neurological: Negative for speech difficulty, weakness, numbness and headaches.   All other systems reviewed and are negative.      Physical Exam     Patient Vitals for the past 24 hrs:   BP Temp Temp src Pulse Heart Rate Resp SpO2 Height Weight   01/24/20 1922 123/73 -- -- 80 80 15 100 % -- --   01/24/20 1817 -- -- -- -- -- 18 -- -- --   01/24/20 1814 (!) 127/92 98.7  F (37.1  C) Oral 88 -- 18 100 % 1.626 m (5' 4\") 49.9 kg (110 lb)       Physical Exam  Constitutional:  Oriented to person, place, and time.      Appears well-developed and well-nourished.   HENT:   Head:    Normocephalic and atraumatic.   Right Ear:   Tympanic membrane and external ear normal.   Left Ear:   Tympanic membrane and external ear normal.   Mouth/Throat:   Oropharynx is clear and moist and mucous membranes are normal.   Eyes:    Left eye subconjunctival hemorrhage medially.     Pupils are equal, round, and reactive to light.   Neck:    Normal range of motion. Neck supple.   Cardiovascular:  Normal rate, S1 normal, S2 normal and normal heart sounds.      No gallop. No murmur heard.  Pulmonary/Chest:  Effort normal and breath sounds normal.      No wheezes. No rhonchi. No rales.   Abdominal:   Soft. Normal appearance. No hepatosplenomegaly.      No tenderness. No rigidity, no rebound, no guarding.      No CVA tenderness.   Musculoskeletal:  Normal range of motion.   Neurological:   Alert and oriented to person, place, and time.      Normal strength and normal reflexes.      No cranial nerve deficit or sensory deficit.      GCS eye subscore is 4. GCS verbal subscore is 5.      GCS motor subscore is 6. Finger to nose intact bilaterally. Questioned left mild facial droop but  states this is normal and pt says her face may be asymmetric.     Emergency " Department Course   ECG (18:15:30):  Rate 89 bpm. IL interval 142. QRS duration 100. QT/QTc 364/442. P-R-T axes 79 78 75. Normal sinus rhythm. posible left atrial enlargement. Incomplete right bundle branch block (new from 5/20/11). Borderline ECG. Interpreted at 1822 by Monica Beckett MD.    Imaging:  Radiographic findings were communicated with the patient who voiced understanding of the findings.  XR Chest PA and LAT:   IMPRESSION: PA and lateral views of the chest. Lungs appear  hyperinflated but are otherwise clear. No infiltrate or lung  consolidation. Heart is normal in size. No effusions are evident. No  pneumothorax. as per radiology.    Laboratory:  CBC: WBC: 7.0, HGB: 11.2 (L), PLT: 395  BMP: Glucose 102 (H), o/w WNL (Creatinine: 0.65)  1823 Troponin: <0.015    Emergency Department Course:  Nursing notes and vitals reviewed. (1813) I performed an exam of the patient as documented above.     Blood drawn. This was sent to the lab for further testing, results above.     The patient was sent for a chest xray while in the emergency department, findings above.     An EKG was recorded. Results as noted above.     1945 I rechecked the patient and discussed the results of her workup thus far.     Findings and plan explained to the Patient. Patient discharged home with instructions regarding supportive care, medications, and reasons to return. The importance of close follow-up was reviewed.     I personally reviewed the laboratory results with the Patient and answered all related questions prior to discharge.     Impression & Plan    Medical Decision Making:  The patient is a 49-year-old healthy female who comes in with multiple concerns.  She went to the clinic today because she noted some redness in her eye and was noted to have a subconjunctival hemorrhage.  While she was there she noted other concerns and was sent here for evaluation.  The first is that 2 days ago she had an episode about an hour of  feeling like she had some pressure in her chest and between her shoulder blades.  She notes that sometimes she feels more tired than usual.  She has no cardiac risk factors other than family history.  She is had no chest pain for the last 2 days.  Her EKG and troponin are negative.  Due to symptoms and risk of family history I have scheduled an outpatient stress test.  She did express still some uncertainty about what was causing the symptoms.  I did tell her we could admit her to observation to do a stress test tomorrow but she declined that.  She did agree to return if she had any further symptoms.  She will take a baby aspirin until the stress test is done.    The patient also notes a sense of imbalance recently.  She has had sinus congestion over the last month or so and is been treated with a Z-Moisés and is currently on Augmentin.  She does not have vomiting.  She does feels like she is slightly off balance.  She has had this in the past previously.  I thought she had some asymmetry of her face but she says that is just her and her  says that she does not look any different than usual.  She did not have any other neurologic signs.  I suspect that this is some inner ear dysfunction due to her ongoing sinus issues.    The patient is advised to follow-up with her primary after the stress test unless she should have worsening symptoms before then.    Diagnosis:    ICD-10-CM    1. Subconjunctival hemorrhage of left eye H11.32    2. Chest pain, unspecified type R07.9 Exercise Stress Echocardiogram   3. Upper respiratory tract infection, unspecified type J06.9        Disposition:  discharged to home  Armando Lazo  1/24/2020    EMERGENCY DEPARTMENT  Scribe Disclosure:  I, Armando Lazo, am serving as a scribe at 6:13 PM on 1/24/2020 to document services personally performed by Monica Beckett MD based on my observations and the provider's statements to me.        Monica Beckett MD  01/26/20 [id number]

## 2020-01-27 ENCOUNTER — TELEPHONE (OUTPATIENT)
Dept: FAMILY MEDICINE | Facility: CLINIC | Age: 50
End: 2020-01-27

## 2020-01-27 NOTE — TELEPHONE ENCOUNTER
I talked to pt.  Today at work, projecting her voice makes her feel sob.  She would need 3 breaths per sentence. Eyes got blurry.  Sx are worse with activity.  PT feels constricted in her chest. Can't take a full breath. Gets panicky.  PT has never been assessed for allergies and asthma.    From Er on 1/24/2020- ER didn't think she had a heart attack. Has stress test on 1/28/2020.  PT also had a wicked sinus infection in recent past.    This is why pt requested the pulm function test.    Would you want pt to f/u with you for ER and stress test f/u? Or order PFTs?  GISELLE Velasquez

## 2020-01-27 NOTE — TELEPHONE ENCOUNTER
Writer called patient and reviewed message per Dr. Munguia.    Patient verbalized understanding, expressed frustration and would not allow writer to elaborate further on why provider recommends stress echo first.  Patient continually spoke over writer.    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    Patient stated she feels her symptoms are very significant.  Writer recommended patient return to ER for further evaluation if symptoms are severe/worsening.    Patient stated she would not return to ER as that would be two visits in one week.    Patient hung up on writer.    BRITTANY IslasN, RN

## 2020-01-27 NOTE — TELEPHONE ENCOUNTER
Reason for Call:  Other call back    Detailed comments: pt states she has a appointment for a echo stress test tomorrow  at [facility] and would like to get a pulmonary function test same day but was told she needs a referral, would like asap .     Phone Number Patient can be reached at: Cell number on file:    Telephone Information:   Mobile [phone]       Best Time: asap    Can we leave a detailed message on this number? YES    Call taken on 1/27/2020 at 2:35 PM by Bina Roth

## 2020-01-27 NOTE — TELEPHONE ENCOUNTER
I suggest doing stress echo first.  If that is fine and if still not improving - we can consider chest xray and PFT but right now I would hold off on that.

## 2020-01-27 NOTE — TELEPHONE ENCOUNTER
Reason for Call: Request for an order or referral:    Order or referral being requested: test for pulmonary function     Date needed: as soon as possible    Has the patient been seen by the PCP for this problem? YES     Additional comments: [facility] would be closest for her.  Please call when referral is placed    Phone number Patient can be reached at:  Cell number on file:    Telephone Information:   Mobile [phone]       Best Time:  any    Can we leave a detailed message on this number?  YES    Call taken on 1/27/2020 at 11:42 AM by Fatoumata Mccormack

## 2020-01-27 NOTE — TELEPHONE ENCOUNTER
Not sure why she needs it.  Can you just check with patient?  and ask her to see me if she has any question.  I see ER notes but no concerns about lungs in their notes.

## 2020-01-27 NOTE — TELEPHONE ENCOUNTER
1/15/2020 was OV with pcp.  1/24/20 was OV and ER visit.    Pt is requesting pulm function testing.  I pended the order.  Will send to pcp.  GISELLE Velasquez

## 2020-01-28 ENCOUNTER — HOSPITAL ENCOUNTER (OUTPATIENT)
Dept: CARDIOLOGY | Facility: CLINIC | Age: 50
Discharge: HOME | End: 2020-01-28
Attending: EMERGENCY MEDICINE | Admitting: EMERGENCY MEDICINE
Payer: COMMERCIAL

## 2020-01-28 ENCOUNTER — OFFICE VISIT (OUTPATIENT)
Dept: FAMILY MEDICINE | Facility: CLINIC | Age: 50
End: 2020-01-28
Payer: COMMERCIAL

## 2020-01-28 VITALS
TEMPERATURE: 98.3 F | SYSTOLIC BLOOD PRESSURE: 107 MMHG | HEART RATE: 72 BPM | BODY MASS INDEX: 18.78 KG/M2 | RESPIRATION RATE: 16 BRPM | OXYGEN SATURATION: 98 % | HEIGHT: 64 IN | WEIGHT: 110 LBS | DIASTOLIC BLOOD PRESSURE: 76 MMHG

## 2020-01-28 DIAGNOSIS — R07.9 CHEST PAIN, UNSPECIFIED TYPE: ICD-10-CM

## 2020-01-28 DIAGNOSIS — R06.02 SOB (SHORTNESS OF BREATH): Primary | ICD-10-CM

## 2020-01-28 PROCEDURE — 25500064 ZZH RX 255 OP 636: Performed by: INTERNAL MEDICINE

## 2020-01-28 PROCEDURE — 93325 DOPPLER ECHO COLOR FLOW MAPG: CPT | Mod: 26 | Performed by: INTERNAL MEDICINE

## 2020-01-28 PROCEDURE — 93018 CV STRESS TEST I&R ONLY: CPT | Performed by: INTERNAL MEDICINE

## 2020-01-28 PROCEDURE — 93321 DOPPLER ECHO F-UP/LMTD STD: CPT | Mod: 26 | Performed by: INTERNAL MEDICINE

## 2020-01-28 PROCEDURE — 99214 OFFICE O/P EST MOD 30 MIN: CPT | Performed by: FAMILY MEDICINE

## 2020-01-28 PROCEDURE — 93350 STRESS TTE ONLY: CPT | Mod: 26 | Performed by: INTERNAL MEDICINE

## 2020-01-28 PROCEDURE — 93016 CV STRESS TEST SUPVJ ONLY: CPT | Performed by: INTERNAL MEDICINE

## 2020-01-28 RX ORDER — ALBUTEROL SULFATE 90 UG/1
2 AEROSOL, METERED RESPIRATORY (INHALATION) EVERY 6 HOURS
Qty: 1 INHALER | Refills: 3 | Status: ON HOLD | OUTPATIENT
Start: 2020-01-28 | End: 2021-02-18

## 2020-01-28 RX ADMIN — PERFLUTREN 5 ML: 6.52 INJECTION, SUSPENSION INTRAVENOUS at 10:20

## 2020-01-28 ASSESSMENT — MIFFLIN-ST. JEOR: SCORE: 1108.96

## 2020-01-28 NOTE — PROGRESS NOTES
Subjective     Tasneem FRANCHESKA Devorah Mccollum is a 49 year old female who presents to clinic today for the following health issues:    HPI   ED/UC Followup:    Facility:  [facility]   Date of visit: 01/24/2020  Reason for visit: chest discomfort   Current Status: having same symptoms, states they are not going away   She did a stress echo which was normal        2) she states that she does not have any cough , no wheezing but has the sensation of chest tightness and that she cannot catch her breath when she tries to take a deep breath, she denies any URI, seems tightness in the center of the chest , no heartburn no acid reflux , no hx of asthma, she works as an  and there are materials in the room , paints and ceramic dust etc , not sure if she might be reacting to that , as this chest tightness happened again when she was at work , she had to sit down , stop talking to the students , lasts about one hr , gets a bit sweaty     Patient Active Problem List   Diagnosis     CARDIOVASCULAR SCREENING; LDL GOAL LESS THAN 160     S/P LEEP of cervix     Seafood allergy     Cyst of left ovary     Past Surgical History:   Procedure Laterality Date     LEEP TX, CERVICAL  1997       Social History     Tobacco Use     Smoking status: Never Smoker     Smokeless tobacco: Never Used   Substance Use Topics     Alcohol use: Yes     Comment: occ     Family History   Problem Relation Age of Onset     Thyroid Disease Father      Lipids Father      Hypertension Father          Current Outpatient Medications   Medication Sig Dispense Refill     acetaminophen (TYLENOL) 325 MG tablet Take 1-2 tablets by mouth every 6 hours as needed. As needed .       albuterol (PROAIR HFA/PROVENTIL HFA/VENTOLIN HFA) 108 (90 Base) MCG/ACT inhaler Inhale 2 puffs into the lungs every 6 hours 1 Inhaler 3     cetirizine (ZYRTEC) 10 MG tablet Take 1 tablet (10 mg) by mouth every evening 30 tablet 0     fluticasone (FLONASE) 50 MCG/ACT spray Spray  1-2 sprays into both nostrils daily Should be seen before further refill. 16 g 0     Ibuprofen (ADVIL) 200 MG capsule Take 200 mg by mouth every 4 hours as needed.       Pseudoephedrine HCl (SUDAFED PO)        Allergies   Allergen Reactions     Dust Mites      Nkda [No Known Drug Allergies]      Recent Labs   Lab Test 01/24/20  1823 05/27/14  1200   A1C  --  5.6   HDL  --  69   CR 0.65  --    GFRESTIMATED >90  --    GFRESTBLACK >90  --    POTASSIUM 3.5  --    TSH  --  1.97      BP Readings from Last 3 Encounters:   01/28/20 107/76   01/24/20 123/73   01/24/20 118/75    Wt Readings from Last 3 Encounters:   01/28/20 49.9 kg (110 lb)   01/24/20 49.9 kg (110 lb)   01/24/20 49.9 kg (110 lb)                    Reviewed and updated as needed this visit by Provider         Review of Systems   ROS COMP: Constitutional, HEENT, cardiovascular, pulmonary, GI, , musculoskeletal, neuro, skin, endocrine and psych systems are negative, except as otherwise noted.      Objective    LMP 01/11/2020 (Approximate)   There is no height or weight on file to calculate BMI.  Physical Exam   GENERAL: healthy, alert and no distress  EYES: Eyes grossly normal to inspection, PERRL and conjunctivae and sclerae normal  NECK: no adenopathy, no asymmetry, masses, or scars and thyroid normal to palpation  RESP: lungs clear to auscultation - no rales, rhonchi or wheezes  CV: regular rate and rhythm, normal S1 S2, no S3 or S4, no murmur, click or rub, no peripheral edema and peripheral pulses strong  MS: no gross musculoskeletal defects noted, no edema    Diagnostic Test Results:  Labs reviewed in Epic  Results for orders placed or performed during the hospital encounter of 01/28/20   Exercise Stress Echocardiogram     Status: None    Narrative    [id number]  [id number]  [id number]  [id number]^RENY^MARGARET           [facility]  Echocardiography Laboratory  [address] [place] [id number]     Name: DARRELL  DAVID OWENS  MRN: [id number]  : 1970  Study Date: 2020 10:23 AM  Age: 49 yrs  Gender: Female  Patient Location: [facility]  Reason For Study: Chest pain, unspecified type  Ordering Physician: MARGARET OGLESBY  Referring Physician: MARGARET OGLESBY  Performed By: Giuseppe Antunez     BSA: 1.5 m2  Height: 64 in  Weight: 110 lb  HR: 82  BP: 114/70 mmHg  _____________________________________________________________________________  __        Procedure  Stress Echo Bike with two dimensional, color and spectral Doppler performed.  _____________________________________________________________________________  __        Interpretation Summary  Exercise stress echocardiogram with no inducible ischemia.     Target heart rate achieved. Normal blood pressure response to exercise.  No angina symptoms with exercise.  No ECG evidence of ischemia.  Normal segmental and global LV function with EF of approximately 55-60% at  rest; with exercise left ventricular cavity size decreases and LVEF increases  to 60-65%.  No stress induced regional wall motion abnormalities.  Less than average functional capacity for age. Exercise time 3:11 minutes     Normal aortic root and no significant valvular dysfunction noted on screening  2D and Doppler examination.  _____________________________________________________________________________  __     Stress  Limiting Symptom: None.  Peak MVO2 24.4 ml/kg/min .  Percent predicted MVO2 85 %.  RPP 25,758.  Maximum workload 75 singh.  Target Heart Rate was achieved.  Exercise was stopped due to dyspnea.  The patient exhibited dyspnea during exercise.     Stress Results                                       Maximum Predicted HR:   171 bpm             Target HR: 145 bpm        % Maximum Predicted HR: 93 %                             Stage    DurationHeart Rate  BP                                    (mm:ss)   (bpm)                         Baseline              82    114/70                       Peak Exercise  3:11      159   162/84                             Stress Duration:   3:11 mm:ss                       Maximum Stress HR: 159 bpm     Left Ventricle  Left ventricular systolic function is normal.     Aortic Valve  The aortic valve is normal in structure and function.     Mitral Valve  The mitral valve is normal in structure and function.        Tricuspid Valve  The tricuspid valve is normal in structure and function.     Right Ventricle  The right ventricular systolic function is normal.     Pericardium  There is no pericardial effusion.     Contrast  Definity (NDC #13314-983-27) given intravenously. Patient was given 5ml  mixture of 1.5ml Definity and 8.5ml saline. 5 ml wasted. Definity Lot # [id number] .  Definity Expiration 08/01/2020 .     _____________________________________________________________________________  __                             Report approved by: Meghana Walker 01/28/2020 11:20 AM                    _____________________________________________________________________________  __              Assessment & Plan     1. SOB (shortness of breath)  We discussed the stress echo , I have recommended that she sees cardiology to get their consult/opinion , also sent for allergy /asthma testing as she could be developing environmental allergies, she will try the Albuterol inhaler and see if this helps with her breathing   - CARDIOLOGY EVAL ADULT REFERRAL; Future  - PULMONARY MEDICINE REFERRAL  - albuterol (PROAIR HFA/PROVENTIL HFA/VENTOLIN HFA) 108 (90 Base) MCG/ACT inhaler; Inhale 2 puffs into the lungs every 6 hours  Dispense: 1 Inhaler; Refill: 3  - ALLERGY/ASTHMA ADULT REFERRAL   if symptoms get worse , would need to be seen .  Pt is aware  and comfortable with the current plan.      Katie Roa MD  [facility]

## 2020-02-03 DIAGNOSIS — R06.00 DYSPNEA: Primary | ICD-10-CM

## 2020-02-05 NOTE — TELEPHONE ENCOUNTER
.  RECORDS RECEIVED FROM: internal/ in process    DATE RECEIVED: 4/10/20   NOTES STATUS DETAILS   OFFICE NOTE from referring provider Internal 1.28.20 [place]   OFFICE NOTE from other specialist N/A    DISCHARGE SUMMARY from hospital N/A    DISCHARGE REPORT from the ER N/A    MEDICATION LIST Internal    IMAGING  (NEED IMAGES AND REPORTS)     CT SCAN N/A    CHEST XRAY (CXR) Internal 1.24.20   TESTS     PULMONARY FUNCTION TESTING (PFT) In process 4/10/20- scheduled

## 2020-02-06 NOTE — PATIENT INSTRUCTIONS
Schedule a fasting lab-only appointment.  Fast for 10 hours prior to labs: nothing to eat or drink except plain water and your pills for ten hours prior to appointment.  In addition, avoid fatty foods and alcohol for 24 hours prior to appointment.    Call [facility] appointment line [phone] to schedule mammogram at [place].      Preventive Health Recommendations  Female Ages 40 to 49    Yearly exam:     See your health care provider every year in order to  1. Review health changes.   2. Discuss preventive care.    3. Review your medicines if your doctor prescribed any.      Get a Pap test every three years (unless you have an abnormal result and your provider advises testing more often).      If you get Pap tests with HPV test, you only need to test every 5 years, unless you have an abnormal result. You do not need a Pap test if your uterus was removed (hysterectomy) and you have not had cancer.      You should be tested each year for STDs (sexually transmitted diseases), if you're at risk.     Ask your doctor if you should have a mammogram.      Have a colonoscopy (test for colon cancer) if someone in your family has had colon cancer or polyps before age 50.       Have a cholesterol test every 5 years.       Have a diabetes test (fasting glucose) after age 45. If you are at risk for diabetes, you should have this test every 3 years.    Shots: Get a flu shot each year. Get a tetanus shot every 10 years.     Nutrition:     Eat at least 5 servings of fruits and vegetables each day.    Eat whole-grain bread, whole-wheat pasta and brown rice instead of white grains and rice.    Get adequate Calcium and Vitamin D.      Lifestyle    Exercise at least 150 minutes a week (an average of 30 minutes a day, 5 days a week). This will help you control your weight and prevent disease.    Limit alcohol to one drink per day.    No smoking.     Wear sunscreen to prevent skin cancer.    See your dentist  every six months for an exam and cleaning.

## 2020-02-06 NOTE — PROGRESS NOTES
SUBJECTIVE:   CC: Tasneem PRITCHARD Devorah Mccollum is an 49 year old woman who presents for preventive health visit.     Healthy Habits:     Getting at least 3 servings of Calcium per day:  NO    Bi-annual eye exam:  Yes    Dental care twice a year:  Yes    Sleep apnea or symptoms of sleep apnea:  None    Diet:  Vegetarian/vegan and Other    Frequency of exercise:  1 day/week    Duration of exercise:  Less than 15 minutes    Taking medications regularly:  Not Applicable    Medication side effects:  Not applicable    PHQ-2 Total Score: 0    Additional concerns today:  Yes    Multiple other concerns that she brought up throughout the visit:    Lingering sinus infection, ER MD told her to quit antibiotics as they weren't helping, symptoms are getting better    CP - seen in ED.  EKG showed new partial RBBB.  Normal stress echo except for deconditioning noted.  Still has occasional chest symptoms and occasional dyspnea on exertion.  She has been given an albuterol MDI to try and she found that helpful for her chest symptoms.  No prior history of asthma.  Another MD recommended cardiologist referral and also referred her to Pulmonary.  She has appointment with pulmonary including PFT's in April.  Not sure if she needs to see Cardiology.      Bone density questions.  Was tested when she was postpartum and breastfeeding and told she had low bone mineral density.  Not sure if she needs to be retested.     Would like to discuss all lab results from ED visit last month.        Today's PHQ-2 Score:   PHQ-2 ( 1999 [employer]) 2/10/2020   Q1: Little interest or pleasure in doing things 0   Q2: Feeling down, depressed or hopeless 0   PHQ-2 Score 0   Q1: Little interest or pleasure in doing things Not at all   Q2: Feeling down, depressed or hopeless Not at all   PHQ-2 Score 0       Abuse: Current or Past(Physical, Sexual or Emotional)- No  Do you feel safe in your environment? Yes        Social History     Tobacco Use     Smoking status:  Never Smoker     Smokeless tobacco: Never Used   Substance Use Topics     Alcohol use: Yes     Comment: occ         Alcohol Use 2/10/2020   Prescreen: >3 drinks/day or >7 drinks/week? No       Reviewed orders with patient.  Reviewed health maintenance and updated orders accordingly - Yes  BP Readings from Last 3 Encounters:   02/10/20 112/74   20 107/76   20 123/73    Wt Readings from Last 3 Encounters:   02/10/20 48.5 kg (107 lb)   20 49.9 kg (110 lb)   20 49.9 kg (110 lb)              Pertinent mammograms are reviewed under the imaging tab.    History of abnormal Pap smear: NO - age 30-65 PAP every 5 years with negative HPV co-testing recommended  PAP / HPV 2014   PAP OTHER-NIL, See Result     Reviewed and updated as needed this visit by clinical staff  Tobacco  Allergies  Meds  Problems  Med Hx  Surg Hx  Fam Hx  Soc Hx          Reviewed and updated as needed this visit by Provider  Tobacco  Meds  Problems  Med Hx  Surg Hx  Fam Hx  Soc Hx       Past Medical History:   Diagnosis Date     Bone disorder     Low bone mass for age     Cyst of left ovary 2015     Menarche 15 years old     Seafood allergy 2015      Past Surgical History:   Procedure Laterality Date     LEEP TX, CERVICAL       OB History    Para Term  AB Living   3 3 3 0 0 3   SAB TAB Ectopic Multiple Live Births   0 0 0 0 0      # Outcome Date GA Lbr Lex/2nd Weight Sex Delivery Anes PTL Lv   3 Term            2 Term            1 Term                Review of Systems   Constitutional: Negative for chills and fever.   HENT: Negative for sore throat.    Eyes: Negative for pain.   Respiratory: Negative for cough.    Cardiovascular: Negative for peripheral edema.   Gastrointestinal: Negative for abdominal pain, constipation, diarrhea, heartburn, hematochezia and nausea.   Breasts:  Negative for tenderness, breast mass and discharge.   Genitourinary: Negative for dysuria, frequency, genital  "sores, hematuria, pelvic pain, urgency, vaginal bleeding and vaginal discharge.   Musculoskeletal: Negative for joint swelling and myalgias.   Skin: Negative for rash.   Neurological: Negative for headaches and paresthesias.   Psychiatric/Behavioral: Negative for mood changes. The patient is not nervous/anxious.           OBJECTIVE:   /74 (BP Location: Left arm, Patient Position: Sitting, Cuff Size: Adult Regular)   Pulse 89   Temp 98.2  F (36.8  C) (Oral)   Resp 14   Ht 1.619 m (5' 3.75\")   Wt 48.5 kg (107 lb)   LMP 01/28/2020 (Approximate)   SpO2 99%   Breastfeeding No   BMI 18.51 kg/m    Physical Exam  GENERAL APPEARANCE: healthy, alert and no distress  EYES: Eyes grossly normal to inspection, PERRL and conjunctivae and sclerae normal  HENT: ear canals and TM's normal, nose and mouth without ulcers or lesions, oropharynx clear and oral mucous membranes moist  NECK: no adenopathy, no asymmetry, masses, or scars and thyroid normal to palpation  RESP: lungs clear to auscultation - no rales, rhonchi or wheezes  BREAST: normal without masses, tenderness or nipple discharge and no palpable axillary masses or adenopathy  CV: regular rate and rhythm, normal S1 S2, no S3 or S4, no murmur, click or rub, no peripheral edema and peripheral pulses strong  ABDOMEN: soft, nontender, no hepatosplenomegaly, no masses and bowel sounds normal  MS: no musculoskeletal defects are noted and gait is age appropriate without ataxia  SKIN: no suspicious lesions or rashes  NEURO: Normal strength and tone, sensory exam grossly normal, mentation intact and speech normal  PSYCH: mentation appears normal and affect normal/bright    Diagnostic Test Results:  Labs reviewed in Epic    ASSESSMENT/PLAN:       ICD-10-CM    1. Routine general medical examination at a health care facility Z00.00    2. Screen for colon cancer Z12.11 GASTROENTEROLOGY ADULT REF PROCEDURE ONLY Other; [facility] [phone]     CANCELED: GASTROENTEROLOGY ADULT " "REF PROCEDURE ONLY [facility] [phone]   3. Lipid screening Z13.220 CANCELED: Lipid panel reflex to direct LDL Fasting   4. Screening for diabetes mellitus Z13.1 CANCELED: **Glucose FUTURE anytime     Given her multiple other concerns and the sporadic way in which she brought them up I recommended a separate appointment dedicated to further discussion.  In particular I do think she needs additional workup of the microcytic anemia noted in the ED,  but she did not bring that up until the end of the appointment.      I recommended she schedule another visit with me and advised her to come fasting to that and we will do all labs together.    COUNSELING:  Reviewed preventive health counseling, as reflected in patient instructions    Estimated body mass index is 18.51 kg/m  as calculated from the following:    Height as of this encounter: 1.619 m (5' 3.75\").    Weight as of this encounter: 48.5 kg (107 lb).  Weight management plan noted, stable and monitoring      Counseling Resources:  ATP IV Guidelines  Pooled Cohorts Equation Calculator  Breast Cancer Risk Calculator  FRAX Risk Assessment  ICSI Preventive Guidelines  Dietary Guidelines for Americans, 2010  USDA's MyPlate  ASA Prophylaxis  Lung CA Screening    Gem Yee MD  [facility]  "

## 2020-02-10 ENCOUNTER — OFFICE VISIT (OUTPATIENT)
Dept: FAMILY MEDICINE | Facility: CLINIC | Age: 50
End: 2020-02-10
Payer: COMMERCIAL

## 2020-02-10 VITALS
WEIGHT: 107 LBS | DIASTOLIC BLOOD PRESSURE: 74 MMHG | RESPIRATION RATE: 14 BRPM | HEART RATE: 89 BPM | BODY MASS INDEX: 18.27 KG/M2 | OXYGEN SATURATION: 99 % | SYSTOLIC BLOOD PRESSURE: 112 MMHG | TEMPERATURE: 98.2 F | HEIGHT: 64 IN

## 2020-02-10 DIAGNOSIS — Z00.00 ROUTINE GENERAL MEDICAL EXAMINATION AT A HEALTH CARE FACILITY: Primary | ICD-10-CM

## 2020-02-10 DIAGNOSIS — Z13.1 SCREENING FOR DIABETES MELLITUS: ICD-10-CM

## 2020-02-10 DIAGNOSIS — Z12.11 SCREEN FOR COLON CANCER: ICD-10-CM

## 2020-02-10 DIAGNOSIS — Z13.220 LIPID SCREENING: ICD-10-CM

## 2020-02-10 PROCEDURE — 90471 IMMUNIZATION ADMIN: CPT | Performed by: FAMILY MEDICINE

## 2020-02-10 PROCEDURE — 90715 TDAP VACCINE 7 YRS/> IM: CPT | Performed by: FAMILY MEDICINE

## 2020-02-10 PROCEDURE — 99396 PREV VISIT EST AGE 40-64: CPT | Mod: 25 | Performed by: FAMILY MEDICINE

## 2020-02-10 ASSESSMENT — ENCOUNTER SYMPTOMS
MYALGIAS: 0
HEARTBURN: 0
DYSURIA: 0
FEVER: 0
HEADACHES: 0
DIARRHEA: 0
PARESTHESIAS: 0
HEMATURIA: 0
FREQUENCY: 0
SORE THROAT: 0
NAUSEA: 0
CONSTIPATION: 0
HEMATOCHEZIA: 0
CHILLS: 0
COUGH: 0
JOINT SWELLING: 0
BREAST MASS: 0
NERVOUS/ANXIOUS: 0
EYE PAIN: 0
ABDOMINAL PAIN: 0

## 2020-02-10 ASSESSMENT — MIFFLIN-ST. JEOR: SCORE: 1091.38

## 2020-02-11 NOTE — NURSING NOTE
Clinic Administered Medication Documentation    MEDICATION LIST:   Injectable Medication Documentation    Patient was given TDAP. Prior to medication administration, verified patients identity using patient s name and date of birth. Please see MAR and medication order for additional information. Patient instructed to remain in clinic for 15 minutes and report any adverse reaction to staff immediately .      Was entire vial of medication used? Yes  Vial/Syringe: Syringe  Expiration Date:  11/13/2021  Was this medication supplied by the patient? No     Prior to immunization administration, verified patients identity using patient s name and date of birth. Please see Immunization Activity for additional information.     Screening Questionnaire for Adult Immunization    Are you sick today?   No   Do you have allergies to medications, food, a vaccine component or latex?   No   Have you ever had a serious reaction after receiving a vaccination?   No   Do you have a long-term health problem with heart, lung, kidney, or metabolic disease (e.g., diabetes), asthma, a blood disorder, no spleen, complement component deficiency, a cochlear implant, or a spinal fluid leak?  Are you on long-term aspirin therapy?   No   Do you have cancer, leukemia, HIV/AIDS, or any other immune system problem?   No   Do you have a parent, brother, or sister with an immune system problem?   No   In the past 3 months, have you taken medications that affect  your immune system, such as prednisone, other steroids, or anticancer drugs; drugs for the treatment of rheumatoid arthritis, Crohn s disease, or psoriasis; or have you had radiation treatments?   No   Have you had a seizure, or a brain or other nervous system problem?   No   During the past year, have you received a transfusion of blood or blood    products, or been given immune (gamma) globulin or antiviral drug?   No   For women: Are you pregnant or is there a chance you could become        pregnant during the next month?   No   Have you received any vaccinations in the past 4 weeks?   No     Immunization questionnaire answers were all negative.        Per orders of Dr. Yee, injection of TDAP given by Cleo Escamilla MA. Patient instructed to remain in clinic for 15 minutes afterwards, and to report any adverse reaction to me immediately.       Screening performed by Cleo Escamilla MA on 2/10/2020 at 6:07 PM.

## 2020-02-13 NOTE — TELEPHONE ENCOUNTER
RECORDS RECEIVED FROM:Internal   DATE RECEIVED: 3.2.2020   NOTES STATUS DETAILS   OFFICE NOTE from referring provider    Internal 1.28.2020 Dr. Roa, [facility]   OFFICE NOTE from other cardiologist    N/A    DISCHARGE SUMMARY from hospital    N/A    DISCHARGE REPORT from the ER   N/A    OPERATIVE REPORT    N/A    MEDICATION LIST   Internal    LABS     BMP   Internal 1.24.20   CBC   Internal 1.24.2020   CMP   N/A    Lipids   N/A    TSH   N/A    DIAGNOSTIC PROCEDURES     EKG   Internal 1.24.20   Monitor Reports   N/A    IMAGING (DISC & REPORT)      Echo   N/A    Stress Tests   Internal 1.28.20    Cath   N/A    MRI/MRA   N/A    CT/CTA   N/A

## 2020-03-02 ENCOUNTER — DOCUMENTATION ONLY (OUTPATIENT)
Dept: FAMILY MEDICINE | Facility: CLINIC | Age: 50
End: 2020-03-02

## 2020-03-02 ENCOUNTER — OFFICE VISIT (OUTPATIENT)
Dept: CARDIOLOGY | Facility: CLINIC | Age: 50
End: 2020-03-02
Attending: INTERNAL MEDICINE
Payer: COMMERCIAL

## 2020-03-02 ENCOUNTER — PRE VISIT (OUTPATIENT)
Dept: CARDIOLOGY | Facility: CLINIC | Age: 50
End: 2020-03-02

## 2020-03-02 VITALS
BODY MASS INDEX: 18.32 KG/M2 | HEART RATE: 84 BPM | OXYGEN SATURATION: 99 % | HEIGHT: 64 IN | DIASTOLIC BLOOD PRESSURE: 83 MMHG | WEIGHT: 107.3 LBS | SYSTOLIC BLOOD PRESSURE: 126 MMHG

## 2020-03-02 DIAGNOSIS — R07.9 CHEST PAIN, UNSPECIFIED TYPE: Primary | ICD-10-CM

## 2020-03-02 DIAGNOSIS — R06.02 SOB (SHORTNESS OF BREATH): ICD-10-CM

## 2020-03-02 PROCEDURE — 93010 ELECTROCARDIOGRAM REPORT: CPT | Mod: ZP | Performed by: INTERNAL MEDICINE

## 2020-03-02 PROCEDURE — 93005 ELECTROCARDIOGRAM TRACING: CPT | Mod: ZF

## 2020-03-02 PROCEDURE — 99203 OFFICE O/P NEW LOW 30 MIN: CPT | Mod: ZP | Performed by: INTERNAL MEDICINE

## 2020-03-02 PROCEDURE — G0463 HOSPITAL OUTPT CLINIC VISIT: HCPCS | Mod: 25,ZF

## 2020-03-02 ASSESSMENT — ENCOUNTER SYMPTOMS
BOWEL INCONTINENCE: 0
SINUS PAIN: 1
BLOOD IN STOOL: 0
SEIZURES: 0
BREAST PAIN: 1
BLOATING: 0
ORTHOPNEA: 1
TINGLING: 0
MEMORY LOSS: 1
HEMOPTYSIS: 0
RECTAL PAIN: 0
EYE IRRITATION: 1
EYE REDNESS: 0
SYNCOPE: 0
DISTURBANCES IN COORDINATION: 0
COUGH DISTURBING SLEEP: 0
WEAKNESS: 1
HYPERTENSION: 0
DIZZINESS: 1
BREAST MASS: 0
SORE THROAT: 0
POSTURAL DYSPNEA: 1
WHEEZING: 0
HEARTBURN: 0
SPUTUM PRODUCTION: 0
BRUISES/BLEEDS EASILY: 0
TASTE DISTURBANCE: 0
NUMBNESS: 0
SLEEP DISTURBANCES DUE TO BREATHING: 1
PALPITATIONS: 0
ABDOMINAL PAIN: 0
SHORTNESS OF BREATH: 1
EYE PAIN: 0
DIARRHEA: 0
HOARSE VOICE: 0
HEADACHES: 0
DOUBLE VISION: 0
TREMORS: 1
HYPOTENSION: 0
CONSTIPATION: 0
TROUBLE SWALLOWING: 0
COUGH: 0
VOMITING: 0
LIGHT-HEADEDNESS: 1
NAUSEA: 0
SINUS CONGESTION: 1
DYSPNEA ON EXERTION: 0
SWOLLEN GLANDS: 0
LOSS OF CONSCIOUSNESS: 0
NECK MASS: 0
EYE WATERING: 1
SMELL DISTURBANCE: 0
SPEECH CHANGE: 0
SNORES LOUDLY: 0
EXERCISE INTOLERANCE: 0
PARALYSIS: 0
JAUNDICE: 0

## 2020-03-02 ASSESSMENT — PAIN SCALES - GENERAL: PAINLEVEL: MILD PAIN (2)

## 2020-03-02 ASSESSMENT — MIFFLIN-ST. JEOR: SCORE: 1096.71

## 2020-03-02 NOTE — PROGRESS NOTES
I do not recommend a lab-only appointment.  I would like to see her to determine what labs to do as documented in my recent note from her annual preventive visit:    From 2/10 visit I said:  Given her multiple other concerns and the sporadic way in which she brought them up I recommended a separate appointment dedicated to further discussion.  In particular I do think she needs additional workup of the microcytic anemia noted in the ED,  but she did not bring that up until the end of the appointment.       I recommended she schedule another visit with me and advised her to come fasting to that and we will do all labs together.      Please cancel lab appointment and change to office visit.    Gem Yee MD

## 2020-03-02 NOTE — PROGRESS NOTES
Referring provider: Katie Roa MD    HPI: Ms. Tasneem Mccollum is a 49 year old  female with no significant PMH.    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 She reports 4 episodes of chest discomfort over the last one month.  She sometimes modifies her activity and slows down due to chest pressure and heaviness.  These episodes can last up to 45 minutes.  Denies associated nausea or sweating.  She feels her vision gets blurry and lightheaded when she feels chest pain. She feels SOB when she lies down. No exertional SOB, palpitations or LE edema. Chest pain is not associated with food or respitation. Patient presented to ED with similar symptoms on 1/24/2020.  Work-up including EKG, troponin were normal.  Outpatient stress test was scheduled.  She was also recommended to start aspirin.  Exercise stress echocardiogram 1/28/2020 showed normal baseline structurally echocardiogram with no evidence of stress-induced myocardial ischemia.    No prior history of cardiac disease. She doesnot exercise.  Denies smoking, drug abuse, or alcohol.  No family history of premature CAD.Patient is currently on as needed albuterol and aspirin.  I have reviewed patient's EKG in clinic today which shows sinus rhythm and incomplete right bundle branch block otherwise unremarkable.  Reviewed labs which showed mild anemia at 11.2 and normal BMP.  No lipids available in the chart.  Medications, personal, family, and social history reviewed with patient and revised.    PAST MEDICAL HISTORY:  Past Medical History:   Diagnosis Date     Bone disorder     Low bone mass for age     Cyst of left ovary 8/21/2015     Menarche 15 years old     Seafood allergy 5/19/2015       CURRENT MEDICATIONS:  Current Outpatient Medications   Medication Sig Dispense Refill     acetaminophen (TYLENOL) 325 MG tablet Take 1-2 tablets by mouth every 6 hours as needed. As needed .        albuterol (PROAIR HFA/PROVENTIL HFA/VENTOLIN HFA) 108 (90 Base) MCG/ACT inhaler Inhale 2 puffs into the lungs every 6 hours 1 Inhaler 3     cetirizine (ZYRTEC) 10 MG tablet Take 1 tablet (10 mg) by mouth every evening 30 tablet 0     fluticasone (FLONASE) 50 MCG/ACT spray Spray 1-2 sprays into both nostrils daily Should be seen before further refill. 16 g 0     Ibuprofen (ADVIL) 200 MG capsule Take 200 mg by mouth every 4 hours as needed.       Pseudoephedrine HCl (SUDAFED PO)          PAST SURGICAL HISTORY:  Past Surgical History:   Procedure Laterality Date     LEEP TX, CERVICAL  1997       ALLERGIES:     Allergies   Allergen Reactions     Dust Mites      Nkda [No Known Drug Allergies]        FAMILY HISTORY:  Family History   Problem Relation Age of Onset     Thyroid Disease Father      Lipids Father      Hypertension Father      Alzheimer Disease Maternal Grandmother          SOCIAL HISTORY:  Social History     Tobacco Use     Smoking status: Never Smoker     Smokeless tobacco: Never Used   Substance Use Topics     Alcohol use: Yes     Comment: occ     Drug use: No       ROS:   Answers for HPI/ROS submitted by the patient on 3/2/2020   General Symptoms: No  Skin Symptoms: No  HENT Symptoms: Yes  EYE SYMPTOMS: Yes  HEART SYMPTOMS: Yes  LUNG SYMPTOMS: Yes  INTESTINAL SYMPTOMS: Yes  URINARY SYMPTOMS: No  GYNECOLOGIC SYMPTOMS: No  BREAST SYMPTOMS: Yes  SKELETAL SYMPTOMS: No  BLOOD SYMPTOMS: Yes  NERVOUS SYSTEM SYMPTOMS: Yes  MENTAL HEALTH SYMPTOMS: No  Ear pain: Yes  Ear discharge: No  Hearing loss: Yes  Tinnitus: No  Nosebleeds: No  Congestion: Yes  Sinus pain: Yes  Trouble swallowing: No   Voice hoarseness: No  Mouth sores: No  Sore throat: No  Tooth pain: No  Gum tenderness: No  Bleeding gums: No  Change in taste: No  Change in sense of smell: No  Dry mouth: No  Hearing aid used: No  Neck lump: No  Eye pain: No  Vision loss: No  Dry eyes: Yes  Watery eyes: Yes  Eye bulging: No  Double vision: No  Flashing of  "lights: No  Spots: No  Floaters: No  Redness: No  Crossed eyes: No  Tunnel Vision: No  Yellowing of eyes: No  Eye irritation: Yes  Cough: No  Sputum or phlegm: No  Coughing up blood: No  Difficulty breating or shortness of breath: Yes  Snoring: No  Wheezing: No  Difficulty breathing on exertion: No  Nighttime Cough: No  Difficulty breathing when lying flat: Yes  Chest pain or pressure: Yes  Fast or irregular heartbeat: No  Trouble breathing while lying down: Yes  Fingers or toes appear blue: No  High blood pressure: No  Low blood pressure: No  Fainting: No  Murmurs: No  Pacemaker: No  Varicose veins: No  Edema or swelling: No  Wake up at night with shortness of breath: Yes  Light-headedness: Yes  Exercise intolerance: No  Heart burn or indigestion: No  Nausea: No  Vomiting: No  Abdominal pain: No  Bloating: No  Constipation: No  Diarrhea: No  Blood in stool: No  Black stools: No  Rectal or Anal pain: No  Fecal incontinence: No  Yellowing of skin or eyes: No  Vomit with blood: No  Change in stools: No  Anemia: Yes  Swollen glands: No  Easy bleeding or bruising: No  Trouble with coordination: No  Dizziness or trouble with balance: Yes  Fainting or black-out spells: No  Memory loss: Yes  Headache: No  Seizures: No  Speech problems: No  Tingling: No  Tremor: Yes  Weakness: Yes  Difficulty walking: No  Paralysis: No  Numbness: No  Discharge: No  Lumps: No  Pain: Yes  Nipple retraction: No    Exam:  /83 (BP Location: Right arm, Patient Position: Chair, Cuff Size: Adult Regular)   Pulse 84   Ht 1.626 m (5' 4\")   Wt 48.7 kg (107 lb 4.8 oz)   SpO2 99%   BMI 18.42 kg/m    GENERAL APPEARANCE: alert and no distress  HEENT: no icterus, no central cyanosis  LYMPH/NECK: no adenopathy, no asymmetry, JVP not elevated, no carotid bruits.  RESPIRATORY: lungs clear to auscultation - no rales, rhonchi or wheezes, no use of accessory muscles, no retractions, respirations are unlabored, normal respiratory rate  CARDIOVASCULAR: " regular rhythm, normal S1, S2, no S3 or S4 and no murmur, click or rub, precordium quiet with normal PMI.  GI: soft, non tender  EXTREMITIES: peripheral pulses normal, no edema  NEURO: alert, normal speech,and affect  VASC: Radial, dorsalis pedis and posterior tibialis pulses are normal in volumes and symmetric bilaterally.   SKIN: no ecchymoses, no rashes     I have reviewed the labs and personally reviewed the imaging below and made my comment in the assessment and plan.    Labs:  CBC RESULTS:   Lab Results   Component Value Date    WBC 7.0 01/24/2020    RBC 4.72 01/24/2020    HGB 11.2 (L) 01/24/2020    HCT 36.4 01/24/2020    MCV 77 (L) 01/24/2020    MCH 23.7 (L) 01/24/2020    MCHC 30.8 (L) 01/24/2020    RDW 16.3 (H) 01/24/2020     01/24/2020       BMP RESULTS:  Lab Results   Component Value Date     01/24/2020    POTASSIUM 3.5 01/24/2020    CHLORIDE 105 01/24/2020    CO2 28 01/24/2020    ANIONGAP 5 01/24/2020     (H) 01/24/2020    BUN 15 01/24/2020    CR 0.65 01/24/2020    GFRESTIMATED >90 01/24/2020    GFRESTBLACK >90 01/24/2020    ANNETTE 8.6 01/24/2020        Exercise Stress Echocardiogram 1/28/2020    Exercise stress echocardiogram with no inducible ischemia.     Target heart rate achieved. Normal blood pressure response to exercise.  No angina symptoms with exercise.  No ECG evidence of ischemia.  Normal segmental and global LV function with EF of approximately 55-60% at  rest; with exercise left ventricular cavity size decreases and LVEF increases  to 60-65%.  No stress induced regional wall motion abnormalities.  Less than average functional capacity for age. Exercise time 3:11 minutes     Normal aortic root and no significant valvular dysfunction noted on screening  2D and Doppler examination    EKG 3/2/2020; I have personnaly reviewed ECG below which shows sinus rhyth, incomplete RBBB, otherwise unremarkable.        Assessment and Plan:   Ms. Tasneem Mccollum is a 49 year old   female with no significant PMH.    Patient reports chest discomfort sometimes brought on by activity over the last month or so.  She sometimes slows down due to chest discomfort.  No major risk factors (no lipid profile in the chart).  Exercise stress echocardiogram which I have personally reviewed shows a structurally normal heart with no induced ischemia.  Baseline EKG is unremarkable.    Due to patient's continued symptoms and anxiety related to the symptom I recommended CT coronary angiogram.  If CTA is normal with 0 calcium I will discontinue aspirin.    Recommend fasting lipid test to be done with PCP.    Return to clinic as needed.    A total of 30 minutes spent face-toface with greater than 50% of the time spent in counseling and coordinating cares of the issues above.     Please donot hesitate to contact me if you have any questions or concerns. Again, thank you for allowing me to participate in the care of your patient.    Jaden RUVALCABA MD  [facility] Division of Cardiology  Pager [phone]

## 2020-03-02 NOTE — PROGRESS NOTES
"Patient has lab only appt 3/10/20 for   \"-anemia  -cholesterol over due?  -lipids  -diabetes test?\",   no orders in chart.  Please place FUTURE orders in Epic if appropriate.    Thanks,   lab    "

## 2020-03-02 NOTE — LETTER
3/2/2020      RE: Tasneem Mccollum  [address]  [facility] [address]       Dear Colleague,    Thank you for the opportunity to participate in the care of your patient, Tasneem Mccollum, at the [facility]. Please see a copy of my visit note below.    Referring provider: Katie Roa MD    HPI: Ms. Tasneem Mccollum is a 49 year old  female with no significant PMH.    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 She reports 4 episodes of chest discomfort over the last one month.  She sometimes modifies her activity and slows down due to chest pressure and heaviness.  These episodes can last up to 45 minutes.  Denies associated nausea or sweating.  She feels her vision gets blurry and lightheaded when she feels chest pain. She feels SOB when she lies down. No exertional SOB, palpitations or LE edema. Chest pain is not associated with food or respitation. Patient presented to ED with similar symptoms on 1/24/2020.  Work-up including EKG, troponin were normal.  Outpatient stress test was scheduled.  She was also recommended to start aspirin.  Exercise stress echocardiogram 1/28/2020 showed normal baseline structurally echocardiogram with no evidence of stress-induced myocardial ischemia.    No prior history of cardiac disease. She doesnot exercise.  Denies smoking, drug abuse, or alcohol.  No family history of premature CAD.Patient is currently on as needed albuterol and aspirin.  I have reviewed patient's EKG in clinic today which shows sinus rhythm and incomplete right bundle branch block otherwise unremarkable.  Reviewed labs which showed mild anemia at 11.2 and normal BMP.  No lipids available in the chart.  Medications, personal, family, and social history reviewed with patient and revised.    PAST MEDICAL HISTORY:  Past Medical History:   Diagnosis Date     Bone  "disorder     Low bone mass for age     Cyst of left ovary 8/21/2015     Menarche 15 years old     Seafood allergy 5/19/2015       CURRENT MEDICATIONS:  Current Outpatient Medications   Medication Sig Dispense Refill     acetaminophen (TYLENOL) 325 MG tablet Take 1-2 tablets by mouth every 6 hours as needed. As needed .       albuterol (PROAIR HFA/PROVENTIL HFA/VENTOLIN HFA) 108 (90 Base) MCG/ACT inhaler Inhale 2 puffs into the lungs every 6 hours 1 Inhaler 3     cetirizine (ZYRTEC) 10 MG tablet Take 1 tablet (10 mg) by mouth every evening 30 tablet 0     fluticasone (FLONASE) 50 MCG/ACT spray Spray 1-2 sprays into both nostrils daily Should be seen before further refill. 16 g 0     Ibuprofen (ADVIL) 200 MG capsule Take 200 mg by mouth every 4 hours as needed.       Pseudoephedrine HCl (SUDAFED PO)          PAST SURGICAL HISTORY:  Past Surgical History:   Procedure Laterality Date     LEEP TX, CERVICAL  1997       ALLERGIES:     Allergies   Allergen Reactions     Dust Mites      Nkda [No Known Drug Allergies]        FAMILY HISTORY:  Family History   Problem Relation Age of Onset     Thyroid Disease Father      Lipids Father      Hypertension Father      Alzheimer Disease Maternal Grandmother          SOCIAL HISTORY:  Social History     Tobacco Use     Smoking status: Never Smoker     Smokeless tobacco: Never Used   Substance Use Topics     Alcohol use: Yes     Comment: occ     Drug use: No     Exam:  /83 (BP Location: Right arm, Patient Position: Chair, Cuff Size: Adult Regular)   Pulse 84   Ht 1.626 m (5' 4\")   Wt 48.7 kg (107 lb 4.8 oz)   SpO2 99%   BMI 18.42 kg/m     GENERAL APPEARANCE: alert and no distress  HEENT: no icterus, no central cyanosis  LYMPH/NECK: no adenopathy, no asymmetry, JVP not elevated, no carotid bruits.  RESPIRATORY: lungs clear to auscultation - no rales, rhonchi or wheezes, no use of accessory muscles, no retractions, respirations are unlabored, normal respiratory " rate  CARDIOVASCULAR: regular rhythm, normal S1, S2, no S3 or S4 and no murmur, click or rub, precordium quiet with normal PMI.  GI: soft, non tender  EXTREMITIES: peripheral pulses normal, no edema  NEURO: alert, normal speech,and affect  VASC: Radial, dorsalis pedis and posterior tibialis pulses are normal in volumes and symmetric bilaterally.   SKIN: no ecchymoses, no rashes     I have reviewed the labs and personally reviewed the imaging below and made my comment in the assessment and plan.    Labs:  CBC RESULTS:   Lab Results   Component Value Date    WBC 7.0 01/24/2020    RBC 4.72 01/24/2020    HGB 11.2 (L) 01/24/2020    HCT 36.4 01/24/2020    MCV 77 (L) 01/24/2020    MCH 23.7 (L) 01/24/2020    MCHC 30.8 (L) 01/24/2020    RDW 16.3 (H) 01/24/2020     01/24/2020       BMP RESULTS:  Lab Results   Component Value Date     01/24/2020    POTASSIUM 3.5 01/24/2020    CHLORIDE 105 01/24/2020    CO2 28 01/24/2020    ANIONGAP 5 01/24/2020     (H) 01/24/2020    BUN 15 01/24/2020    CR 0.65 01/24/2020    GFRESTIMATED >90 01/24/2020    GFRESTBLACK >90 01/24/2020    ANNETTE 8.6 01/24/2020        Exercise Stress Echocardiogram 1/28/2020    Exercise stress echocardiogram with no inducible ischemia.     Target heart rate achieved. Normal blood pressure response to exercise.  No angina symptoms with exercise.  No ECG evidence of ischemia.  Normal segmental and global LV function with EF of approximately 55-60% at  rest; with exercise left ventricular cavity size decreases and LVEF increases  to 60-65%.  No stress induced regional wall motion abnormalities.  Less than average functional capacity for age. Exercise time 3:11 minutes     Normal aortic root and no significant valvular dysfunction noted on screening  2D and Doppler examination    EKG 3/2/2020; I have personnaly reviewed ECG below which shows sinus rhyth, incomplete RBBB, otherwise unremarkable.        Assessment and Plan:   Ms. Tasneem Mccollum  is a 49 year old  female with no significant PMH.    Patient reports chest discomfort sometimes brought on by activity over the last month or so.  She sometimes slows down due to chest discomfort.  No major risk factors (no lipid profile in the chart).  Exercise stress echocardiogram which I have personally reviewed shows a structurally normal heart with no induced ischemia.  Baseline EKG is unremarkable.    Due to patient's continued symptoms and anxiety related to the symptom I recommended CT coronary angiogram.  If CTA is normal with 0 calcium I will discontinue aspirin.    Recommend fasting lipid test to be done with PCP.    Return to clinic as needed.    A total of 30 minutes spent face-toface with greater than 50% of the time spent in counseling and coordinating cares of the issues above.     Please donot hesitate to contact me if you have any questions or concerns. Again, thank you for allowing me to participate in the care of your patient.    Jaden RUVALCABA MD  [facility] Division of Cardiology  Pager [phone]

## 2020-03-02 NOTE — NURSING NOTE
Chief Complaint   Patient presents with     New Patient     new - 1-28-20 referral from [facility]     Vitals were taken and medications were reconciled. EKG was performed.    Deb Burch  5:44 PM

## 2020-03-03 LAB — INTERPRETATION ECG - MUSE: NORMAL

## 2020-03-03 NOTE — PATIENT INSTRUCTIONS
Patient Instructions:  Complete CT coronary angiogram.  If results are normal, you can stop taking aspirin.  Follow up will be based on test results. As soon as they are compiled and reviewed, you will be notified.     CT Angiogram     1. Nothing to eat or drink except water 3 hours prior.  2. Avoid caffeine, smoking, or strenuous activity on the day of the test.  3. You will receive contrast, so plan to drink extra water the day before and after your test.  4. If you take any of the following medications, please HOLD the day of:     * Diuretic the day of. (Examples: Furosemide (Lasix), Torsemide, Bumetanide (Bumex), Metolazone (Zaroxolyn) and Hydrochlorothiazide.)     * NSAIDS (Examples: ibuprofen/Advil/Motrin, naproxen/Aleve) the day of.      5. Please allow 2 hours of time for preparation and testing.      If you have further questions, please utilize [employer] to contact us.   If your question concerns the above instructions, contact:  Virgilio Bueno LPN  Nurse Care Coordinator- Heart Care  [phone]      It was a pleasure to see you in the cardiology clinic today.    We are encouraging the use of [employer] to communicate with your Healthcare Provider.  If you have any questions, call  Virgilio Bueno LPN, at [phone].  Press Option #1 for the [facility], and then press Option #4 for nursing.  Cardiology Fax  : [phone]      If you have an urgent need after hours (8:00 am to 4:30 pm) please call [phone] and ask for the cardiology fellow on call.

## 2020-03-05 NOTE — PROGRESS NOTES
"Subjective     Tasneem L Devorah Mccollum is a 49 year old female who presents to clinic today for the following health issues:    HPI   Patient with multiple non-preventive concerns at her recent annual preventive visit.  I asked her to return to discuss these issues:    1) Microcytic Anemia  Noted on labs done in ED in Jan when she presented with chest pain.    Today she reports that she did have heavier periods for a time.  Her menses have become more irregular.  She denies bloody stools.    Hemoglobin   Date Value Ref Range Status   01/24/2020 11.2 (L) 11.7 - 15.7 g/dL Final   05/27/2014 12.2 11.7 - 15.7 g/dL Final   05/20/2011 12.3 11.7 - 15.7 g/dL Final   12/14/2010 11.0 (L) 11.7 - 15.7 g/dL Final     2) Bone Density concerns  She was treated with alendronate in her 30's for low BMD. This was at an outside clinic.  Her mother does have osteoporosis.  She is also concerned because she has had persistent pain in her low back since she went sledding a couple years ago.      3) Chest Pain  She did see cardiology who ordered coronary CTA which is scheduled.  She also has scheduled appointment with pulmonary.      4) Sinus concerns  Sinuses pressure has finally resolved in the past week.    BP Readings from Last 3 Encounters:   03/11/20 112/60   03/02/20 126/83   02/10/20 112/74    Wt Readings from Last 3 Encounters:   03/11/20 48.1 kg (106 lb)   03/02/20 48.7 kg (107 lb 4.8 oz)   02/10/20 48.5 kg (107 lb)            Reviewed and updated as needed this visit by Provider  Meds  Problems         Review of Systems   ROS COMP: Constitutional, HEENT, cardiovascular, pulmonary, gi and gu systems are negative, except as otherwise noted.      Objective    /60 (BP Location: Right arm, Patient Position: Sitting, Cuff Size: Adult Regular)   Pulse 94   Temp 97.4  F (36.3  C) (Tympanic)   Ht 1.626 m (5' 4\")   Wt 48.1 kg (106 lb)   SpO2 100%   BMI 18.19 kg/m    Body mass index is 18.19 kg/m .  Physical Exam   GEN:  no " apparent distress  BACK:  No focal tenderness to palpation over the spinous processes, coccyx, and SI joints.  There is focal tenderness to palpation over the left ischial tuberosity.      Diagnostic Test Results:  Labs reviewed in Epic        Assessment & Plan   30 minutes time spent face-to-face, with over 50% spent in counseling/coordination of care regardin. Microcytic anemia  Unclear etiology/diagnosis with uncertain prognosis requiring further workup to confirm iron deficiency.  If TENISHA this is most likely due to menstrual blood loss but I also strongly encouraged her to schedule colonoscopy as previously ordered.    - CBC with platelets and differential; Future  - Iron and iron binding capacity; Future  - Ferritin; Future    2. Low bone density for age  Unusual history of bisphosphonate treatment in her 30's.  I'd like to get another DEXA (bone density) scan.  - DX Hip/Pelvis/Spine; Future    3. Pain in left buttock  Discussed that this is not related to low BMD.  Any potential fx would be healed by now.  I recommended trial of Physical Therapy as her ongoing ischial tuberosity pain may be due to insertional tendonitis.  - [facility]; Future    4. Atypical chest pain  Agree with plan for Coronary CTA.      5. Lipid screening  - Lipid panel reflex to direct LDL Fasting; Future    6. Screening for diabetes mellitus  - Glucose; Future        Patient Instructions   Schedule a fasting lab-only appointment.  Fast for 10 hours prior to labs: nothing to eat or drink except plain water and your pills for ten hours prior to appointment.  In addition, avoid fatty foods and alcohol for 24 hours prior to appointment.     You have an open order for DEXA (bone density) scan which you can schedule at [facility] at any time over the next year.       No follow-ups on file.    Gem Yee MD  [facility]

## 2020-03-11 ENCOUNTER — OFFICE VISIT (OUTPATIENT)
Dept: FAMILY MEDICINE | Facility: CLINIC | Age: 50
End: 2020-03-11
Payer: COMMERCIAL

## 2020-03-11 VITALS
TEMPERATURE: 97.4 F | HEART RATE: 94 BPM | DIASTOLIC BLOOD PRESSURE: 60 MMHG | OXYGEN SATURATION: 100 % | SYSTOLIC BLOOD PRESSURE: 112 MMHG | WEIGHT: 106 LBS | HEIGHT: 64 IN | BODY MASS INDEX: 18.1 KG/M2

## 2020-03-11 DIAGNOSIS — R07.89 ATYPICAL CHEST PAIN: ICD-10-CM

## 2020-03-11 DIAGNOSIS — Z13.220 LIPID SCREENING: ICD-10-CM

## 2020-03-11 DIAGNOSIS — M85.9 LOW BONE DENSITY FOR AGE: ICD-10-CM

## 2020-03-11 DIAGNOSIS — D50.9 MICROCYTIC ANEMIA: Primary | ICD-10-CM

## 2020-03-11 DIAGNOSIS — M79.18 PAIN IN LEFT BUTTOCK: ICD-10-CM

## 2020-03-11 DIAGNOSIS — Z13.1 SCREENING FOR DIABETES MELLITUS: ICD-10-CM

## 2020-03-11 PROCEDURE — 99214 OFFICE O/P EST MOD 30 MIN: CPT | Performed by: FAMILY MEDICINE

## 2020-03-11 ASSESSMENT — MIFFLIN-ST. JEOR: SCORE: 1090.81

## 2020-03-11 NOTE — PATIENT INSTRUCTIONS
Schedule a fasting lab-only appointment.  Fast for 10 hours prior to labs: nothing to eat or drink except plain water and your pills for ten hours prior to appointment.  In addition, avoid fatty foods and alcohol for 24 hours prior to appointment.     You have an open order for DEXA (bone density) scan which you can schedule at [facility] at any time over the next year.

## 2020-04-10 ENCOUNTER — PRE VISIT (OUTPATIENT)
Dept: PULMONOLOGY | Facility: CLINIC | Age: 50
End: 2020-04-10

## 2020-04-26 ENCOUNTER — MYC MEDICAL ADVICE (OUTPATIENT)
Dept: FAMILY MEDICINE | Facility: CLINIC | Age: 50
End: 2020-04-26

## 2020-04-27 NOTE — TELEPHONE ENCOUNTER
Wants to know if she would be a candidate for a coronavirus antibody test when they are available.

## 2020-05-11 ENCOUNTER — MYC MEDICAL ADVICE (OUTPATIENT)
Dept: FAMILY MEDICINE | Facility: CLINIC | Age: 50
End: 2020-05-11

## 2020-05-12 NOTE — TELEPHONE ENCOUNTER
Dr. Yee-Please review and advise:  1. It is writer's understanding patient cannot be tested for COVID-19 antibody if currently symptomatic and would need to wait until 10 days after symptoms resolve.  Once patient asymptomatic, patient may either request antibody testing through [facility] or with virtual/eVisit with provider    Thank you!  JILL Rollins, BRITTANYN, RN

## 2020-10-26 ENCOUNTER — NURSE TRIAGE (OUTPATIENT)
Dept: NURSING | Facility: CLINIC | Age: 50
End: 2020-10-26

## 2020-10-26 DIAGNOSIS — Z20.822 COVID-19 RULED OUT: Primary | ICD-10-CM

## 2020-10-26 NOTE — TELEPHONE ENCOUNTER
"Patient is calling requesting COVID serologic antibody testing.  NOTE: Serologic testing is a blood test for 'antibodies' which are made at 10-14 days after you have had symptoms of COVID or were exposed and had an asymptomatic infection.  This does NOT test you for 'active' infection or tell you if you are contagious.    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 no  Do you currently have a cough, fever, body aches, shortness of breath, or difficulty breathing?  No  Did you previously have cough, fever, body aches, shortness of breath, or difficulty breathing that have now resolved? Has had previous covid symptoms.   Symptoms began 160    days ago.  Symptoms started > 14 days ago. Lab order placed per SARS-CoV-2 Serology test Standing Order using indication \"Previously symptomatic >14d since onset, currently asymptomatic\" and diagnosis code \"Screening for viral disease\" (Z11.59)      The patient was informed: \"Testing is limited each day and it may take time for testing to be available to everyone who has called. You will receive a call within 48-72 hours to schedule the serology testing. Please confirm the best number to reach you is [phone]. If you have any questions about scheduling, call [phone].\"         "

## 2020-10-29 DIAGNOSIS — Z20.822 COVID-19 RULED OUT: ICD-10-CM

## 2020-10-29 PROCEDURE — 86769 SARS-COV-2 COVID-19 ANTIBODY: CPT | Mod: 90 | Performed by: PATHOLOGY

## 2020-10-29 PROCEDURE — 99000 SPECIMEN HANDLING OFFICE-LAB: CPT | Performed by: PATHOLOGY

## 2020-10-29 PROCEDURE — 36415 COLL VENOUS BLD VENIPUNCTURE: CPT | Performed by: PATHOLOGY

## 2020-10-30 LAB
COVID-19 ANTIBODY IGG: NEGATIVE
LAB TEST METHOD: NORMAL

## 2020-12-27 ENCOUNTER — NURSE TRIAGE (OUTPATIENT)
Dept: NURSING | Facility: CLINIC | Age: 50
End: 2020-12-27

## 2020-12-27 NOTE — TELEPHONE ENCOUNTER
Answered pt's general questions about upcoming labs.     Additional Information    Negative: [1] Caller is not with the adult (patient) AND [2] reporting urgent symptoms    Negative: Lab result questions    Negative: Medication questions    Negative: Caller can't be reached by phone    Negative: Caller has already spoken to PCP or another triager    Negative: RN needs further essential information from caller in order to complete triage    Negative: Requesting regular office appointment    Negative: [1] Caller requesting NON-URGENT health information AND [2] PCP's office is the best resource    Health Information question, no triage required and triager able to answer question    Protocols used: INFORMATION ONLY CALL-A-AH

## 2020-12-28 DIAGNOSIS — Z13.220 LIPID SCREENING: ICD-10-CM

## 2020-12-28 DIAGNOSIS — Z13.1 SCREENING FOR DIABETES MELLITUS: ICD-10-CM

## 2020-12-28 DIAGNOSIS — D50.9 MICROCYTIC ANEMIA: ICD-10-CM

## 2020-12-28 LAB
BASOPHILS # BLD AUTO: 0 10E9/L (ref 0–0.2)
BASOPHILS NFR BLD AUTO: 0.5 %
CHOLEST SERPL-MCNC: 215 MG/DL
DIFFERENTIAL METHOD BLD: ABNORMAL
EOSINOPHIL # BLD AUTO: 0.2 10E9/L (ref 0–0.7)
EOSINOPHIL NFR BLD AUTO: 2.6 %
ERYTHROCYTE [DISTWIDTH] IN BLOOD BY AUTOMATED COUNT: 15.3 % (ref 10–15)
FERRITIN SERPL-MCNC: 5 NG/ML (ref 8–252)
GLUCOSE SERPL-MCNC: 84 MG/DL (ref 70–99)
HCT VFR BLD AUTO: 37.2 % (ref 35–47)
HDLC SERPL-MCNC: 82 MG/DL
HGB BLD-MCNC: 11.4 G/DL (ref 11.7–15.7)
IRON SATN MFR SERPL: 4 % (ref 15–46)
IRON SERPL-MCNC: 17 UG/DL (ref 35–180)
LDLC SERPL CALC-MCNC: 122 MG/DL
LYMPHOCYTES # BLD AUTO: 2.1 10E9/L (ref 0.8–5.3)
LYMPHOCYTES NFR BLD AUTO: 31.5 %
MCH RBC QN AUTO: 25.2 PG (ref 26.5–33)
MCHC RBC AUTO-ENTMCNC: 30.6 G/DL (ref 31.5–36.5)
MCV RBC AUTO: 82 FL (ref 78–100)
MONOCYTES # BLD AUTO: 0.6 10E9/L (ref 0–1.3)
MONOCYTES NFR BLD AUTO: 9.2 %
NEUTROPHILS # BLD AUTO: 3.7 10E9/L (ref 1.6–8.3)
NEUTROPHILS NFR BLD AUTO: 56.2 %
NONHDLC SERPL-MCNC: 133 MG/DL
PLATELET # BLD AUTO: 405 10E9/L (ref 150–450)
RBC # BLD AUTO: 4.52 10E12/L (ref 3.8–5.2)
TIBC SERPL-MCNC: 406 UG/DL (ref 240–430)
TRIGL SERPL-MCNC: 56 MG/DL
WBC # BLD AUTO: 6.5 10E9/L (ref 4–11)

## 2020-12-28 PROCEDURE — 82947 ASSAY GLUCOSE BLOOD QUANT: CPT | Performed by: FAMILY MEDICINE

## 2020-12-28 PROCEDURE — 83540 ASSAY OF IRON: CPT | Performed by: FAMILY MEDICINE

## 2020-12-28 PROCEDURE — 36415 COLL VENOUS BLD VENIPUNCTURE: CPT | Performed by: FAMILY MEDICINE

## 2020-12-28 PROCEDURE — 83550 IRON BINDING TEST: CPT | Performed by: FAMILY MEDICINE

## 2020-12-28 PROCEDURE — 85025 COMPLETE CBC W/AUTO DIFF WBC: CPT | Performed by: FAMILY MEDICINE

## 2020-12-28 PROCEDURE — 80061 LIPID PANEL: CPT | Performed by: FAMILY MEDICINE

## 2020-12-28 PROCEDURE — 82728 ASSAY OF FERRITIN: CPT | Performed by: FAMILY MEDICINE

## 2020-12-29 NOTE — RESULT ENCOUNTER NOTE
Inder Boateng,  I also recommend that you discontinue aspirin and ibuprofen until we further investigate the iron deficiency.      Gem Yee MD

## 2020-12-29 NOTE — RESULT ENCOUNTER NOTE
Inder Boateng,  You continue to have anemia which is essentially unchanged from last March.  This also shows that you have severe iron deficiency.  Have you been taking an iron supplement?  Please schedule a telehealth visit (a scheduled video or telephone visit) with me to further discuss this.      Your fasting blood sugar and lipid panel (cholesterol) results are fine.  Gem Yee MD

## 2020-12-30 ENCOUNTER — VIRTUAL VISIT (OUTPATIENT)
Dept: FAMILY MEDICINE | Facility: CLINIC | Age: 50
End: 2020-12-30
Payer: COMMERCIAL

## 2020-12-30 DIAGNOSIS — Z12.11 SCREEN FOR COLON CANCER: ICD-10-CM

## 2020-12-30 DIAGNOSIS — D50.9 IRON DEFICIENCY ANEMIA, UNSPECIFIED IRON DEFICIENCY ANEMIA TYPE: Primary | ICD-10-CM

## 2020-12-30 PROCEDURE — 99213 OFFICE O/P EST LOW 20 MIN: CPT | Mod: 95 | Performed by: FAMILY MEDICINE

## 2020-12-30 NOTE — PATIENT INSTRUCTIONS
Start taking supplemental iron.  Take the equivalent of 65 mg elemental iron daily with Vitamin C.  If you get constipation you can take this every other day.    Schedule the colonoscopy.    In 2 months schedule another lab-only appointment so we can check your anemia and iron levels.       Did you know?   I do telehealth (video) visits exclusively on  and .  I still do in-person visits at [facility] on  and .  You can schedule a video visit for follow-up appointments as well as future appointments for certain conditions.  Please see the below link.  [email or web address]    If you have not already done so,  I encourage you to sign up for [email or web address] ([email or web address]).  This will allow you to review your results, securely communicate with a provider, and schedule virtual visits as well.    If you are due for a mammogram or colonoscopy please call the [facility] scheduling number: [phone].     To schedule any ordered imaging studies you can call [facility] [phone].

## 2020-12-30 NOTE — PROGRESS NOTES
"Tasneem Mccollum is a 50 year old female who is being evaluated via a billable video visit.      The patient has been notified of following:     \"This video visit will be conducted via a call between you and your physician/provider. We have found that certain health care needs can be provided without the need for an in-person physical exam.  This service lets us provide the care you need with a video conversation.  If a prescription is necessary we can send it directly to your pharmacy.  If lab work is needed we can place an order for that and you can then stop by our lab to have the test done at a later time.    Video visits are billed at different rates depending on your insurance coverage.  Please reach out to your insurance provider with any questions.    If during the course of the call the physician/provider feels a video visit is not appropriate, you will not be charged for this service.\"    Patient has given verbal consent for Video visit? Yes  How would you like to obtain your AVS? MyChart  If you are dropped from the video visit, the video invite should be resent to: Send to e-mail at: [email or web address]  Will anyone else be joining your video visit? No      Subjective     Tasneem Mccollum is a 50 year old female who presents today via video visit for the following health issues:    HPI   Iron deficiency discussion     She was noted to have a mild microcytic anemia in January when she presented to ED with CP.  When I saw her in March I recommended we do additional with of the anemia and she planned to return for a fasting lab-only appointment (including lipid panel).  She never returned for that because the COVID-19 pandemic hit the following week and everything shut down.  She recently completed those labs which confirmed significant iron deficiency but mild anemia.    She is not taking iron supplements.  She is primarily vegetarian but does eat fish and salmon and is not opposed to " eating red meat.      She did have some heavier menses this past year but they have lightened up again.  Her periods are regular.    She is due for screening colonoscopy and I had ordered that in February but she has not completed it due to the pandemic.      Video Start Time: 12:01 PM  12:07 PM - lost sound  Called at 12:10 PM and continued video visit using telephone for audio        Review of Systems         Objective       Vitals:  No vitals were obtained today due to virtual visit.    Physical Exam     GENERAL: Healthy, alert and no distress  EYES: Eyes grossly normal to inspection.  No discharge or erythema, or obvious scleral/conjunctival abnormalities.  RESP: No audible wheeze, cough, or visible cyanosis.  No visible retractions or increased work of breathing.    SKIN: Visible skin clear. No significant rash, abnormal pigmentation or lesions.  NEURO: Cranial nerves grossly intact.  Mentation and speech appropriate for age.  PSYCH: Mentation appears normal, affect normal/bright, judgement and insight intact, normal speech and appearance well-groomed.    Orders Only on 12/28/2020   Component Date Value Ref Range Status     Ferritin 12/28/2020 5* 8 - 252 ng/mL Final     Iron 12/28/2020 17* 35 - 180 ug/dL Final     Iron Binding Cap 12/28/2020 406  240 - 430 ug/dL Final     Iron Saturation Index 12/28/2020 4* 15 - 46 % Final     WBC 12/28/2020 6.5  4.0 - 11.0 10e9/L Final     RBC Count 12/28/2020 4.52  3.8 - 5.2 10e12/L Final     Hemoglobin 12/28/2020 11.4* 11.7 - 15.7 g/dL Final     Hematocrit 12/28/2020 37.2  35.0 - 47.0 % Final     MCV 12/28/2020 82  78 - 100 fl Final     MCH 12/28/2020 25.2* 26.5 - 33.0 pg Final     MCHC 12/28/2020 30.6* 31.5 - 36.5 g/dL Final     RDW 12/28/2020 15.3* 10.0 - 15.0 % Final     Platelet Count 12/28/2020 405  150 - 450 10e9/L Final     Diff Method 12/28/2020 Automated Method   Final     % Neutrophils 12/28/2020 56.2  % Final     % Lymphocytes 12/28/2020 31.5  % Final     %  Monocytes 12/28/2020 9.2  % Final     % Eosinophils 12/28/2020 2.6  % Final     % Basophils 12/28/2020 0.5  % Final     Absolute Neutrophil 12/28/2020 3.7  1.6 - 8.3 10e9/L Final     Absolute Lymphocytes 12/28/2020 2.1  0.8 - 5.3 10e9/L Final     Absolute Monocytes 12/28/2020 0.6  0.0 - 1.3 10e9/L Final     Absolute Eosinophils 12/28/2020 0.2  0.0 - 0.7 10e9/L Final     Absolute Basophils 12/28/2020 0.0  0.0 - 0.2 10e9/L Final     Glucose 12/28/2020 84  70 - 99 mg/dL Final    Fasting specimen     Cholesterol 12/28/2020 215* <200 mg/dL Final    Desirable:       <200 mg/dl     Triglycerides 12/28/2020 56  <150 mg/dL Final    Fasting specimen     HDL Cholesterol 12/28/2020 82  >49 mg/dL Final     LDL Cholesterol Calculated 12/28/2020 122* <100 mg/dL Final    Comment: Above desirable:  100-129 mg/dl  Borderline High:  130-159 mg/dL  High:             160-189 mg/dL  Very high:       >189 mg/dl       Non HDL Cholesterol 12/28/2020 133* <130 mg/dL Final    Comment: Above Desirable:  130-159 mg/dl  Borderline high:  160-189 mg/dl  High:             190-219 mg/dl  Very high:       >219 mg/dl                Assessment & Plan     Iron deficiency anemia, unspecified iron deficiency anemia type  Most likely mixed TENISHA secondary to inadequate iron intake and menstrual blood loss.  Discussed that the stability of her Hgb this past year (with no supplemental iron) is reassuring.  I encouraged her to schedule routine screening colonoscopy and to monitor menstrual bleeding pattern.  I instructed her to notify me if she feels that overall her bleeding is increasing (in terms of flow, duration, and frequency) as I would then recommend a pelvic ultrasound.  I advised her to start taking an oral iron supplement with Vitamin C and to schedule repeat lab-only appointment in 2 months to recheck hgb and iron levels.  She is also considering adding red meat back to her diet.    - Iron and iron binding capacity  - Ferritin  - CBC with platelets  and differential    Screen for colon cancer  - GASTROENTEROLOGY ADULT REF PROCEDURE ONLY          Patient Instructions   Start taking supplemental iron.  Take the equivalent of 65 mg elemental iron daily with Vitamin C.  If you get constipation you can take this every other day.    Schedule the colonoscopy.    In 2 months schedule another lab-only appointment so we can check your anemia and iron levels.       Did you know?   I do telehealth (video) visits exclusively on  and .  I still do in-person visits at [facility] on  and .  You can schedule a video visit for follow-up appointments as well as future appointments for certain conditions.  Please see the below link.  [email or web address]    If you have not already done so,  I encourage you to sign up for [employer] ([email or web address]).  This will allow you to review your results, securely communicate with a provider, and schedule virtual visits as well.    If you are due for a mammogram or colonoscopy please call the [facility] scheduling number: [phone].     To schedule any ordered imaging studies you can call [facility] [phone].          No follow-ups on file.    Gem Yee MD  [facility]      Video-Visit Details    Type of service:  Video Visit    Video End Time:12:27 PM    Originating Location (pt. Location): Home    Distant Location (provider location):  [facility]     Platform used for Video Visit: [employer]

## 2020-12-31 ENCOUNTER — TELEPHONE (OUTPATIENT)
Dept: GASTROENTEROLOGY | Facility: CLINIC | Age: 50
End: 2020-12-31

## 2020-12-31 NOTE — TELEPHONE ENCOUNTER
1st schedule attempt    Procedure: Lower Endoscopy    Lower Endoscopy Type: Colonoscopy    Purpose of Colonoscopy Procedure: Screening    Colonoscopy Sedation: Conscious/Moderate    Preferred Location: [facility]    Scheduling Instructions: If you have not heard from the scheduling office within 2 business days, please call [phone].           Associated Diagnoses    Screen for colon cancer [Z12.11]

## 2021-01-14 ENCOUNTER — TELEPHONE (OUTPATIENT)
Dept: GASTROENTEROLOGY | Facility: CLINIC | Age: 51
End: 2021-01-14

## 2021-01-14 ENCOUNTER — HOSPITAL ENCOUNTER (OUTPATIENT)
Facility: CLINIC | Age: 51
End: 2021-01-14
Attending: INTERNAL MEDICINE | Admitting: INTERNAL MEDICINE
Payer: COMMERCIAL

## 2021-01-14 NOTE — TELEPHONE ENCOUNTER
Patient is scheduled for COLONOSCOPY with Dr. KUNZ    Spoke with: DAVID    Date of Procedure: 02/04    Location: UNIT J    Sedation Type CS    Pre-op for Unit J MAC NOT NEEDED    (if yes advise patient they will need a pre-op prior to procedure)      Is patient on blood thinners? -NO (If yes- inform patient to follow up with PCP or provider for follow up instructions)     Informed patient they will need an adult  YES    Informed Patient of COVID Test Requirement YES    Preferred Pharmacy for Pre Prescription NA    Confirmed Nurse will call to complete assessment YES    Additional comments: NA

## 2021-01-15 ENCOUNTER — HEALTH MAINTENANCE LETTER (OUTPATIENT)
Age: 51
End: 2021-01-15

## 2021-01-19 DIAGNOSIS — Z11.59 ENCOUNTER FOR SCREENING FOR OTHER VIRAL DISEASES: Primary | ICD-10-CM

## 2021-01-27 ENCOUNTER — TELEPHONE (OUTPATIENT)
Dept: GASTROENTEROLOGY | Facility: OUTPATIENT CENTER | Age: 51
End: 2021-01-27

## 2021-01-27 NOTE — TELEPHONE ENCOUNTER
Caller: Us calling pt to reschedule 2/4-Imelda Unit J Colon-CS appointment    Reason for cancel? Due to provider schedule change    Rescheduled? Lvm on pt's home phone, mobile phone is full unable to lvm    Will patient call back to reschedule?n/a

## 2021-02-16 DIAGNOSIS — Z11.59 ENCOUNTER FOR SCREENING FOR OTHER VIRAL DISEASES: ICD-10-CM

## 2021-02-16 LAB
LABORATORY COMMENT REPORT: NORMAL
SARS-COV-2 RNA RESP QL NAA+PROBE: NEGATIVE
SARS-COV-2 RNA RESP QL NAA+PROBE: NORMAL
SPECIMEN SOURCE: NORMAL
SPECIMEN SOURCE: NORMAL

## 2021-02-16 PROCEDURE — 87635 SARS-COV-2 COVID-19 AMP PRB: CPT | Performed by: INTERNAL MEDICINE

## 2021-02-18 ENCOUNTER — HOSPITAL ENCOUNTER (OUTPATIENT)
Facility: CLINIC | Age: 51
Discharge: HOME | End: 2021-02-18
Attending: INTERNAL MEDICINE | Admitting: INTERNAL MEDICINE
Payer: COMMERCIAL

## 2021-02-18 VITALS
OXYGEN SATURATION: 99 % | RESPIRATION RATE: 14 BRPM | TEMPERATURE: 98.1 F | HEART RATE: 68 BPM | SYSTOLIC BLOOD PRESSURE: 111 MMHG | DIASTOLIC BLOOD PRESSURE: 73 MMHG

## 2021-02-18 DIAGNOSIS — E61.1 IRON DEFICIENCY: Primary | ICD-10-CM

## 2021-02-18 LAB — COLONOSCOPY: NORMAL

## 2021-02-18 PROCEDURE — 99153 MOD SED SAME PHYS/QHP EA: CPT | Performed by: INTERNAL MEDICINE

## 2021-02-18 PROCEDURE — 250N000011 HC RX IP 250 OP 636: Performed by: INTERNAL MEDICINE

## 2021-02-18 PROCEDURE — G0500 MOD SEDAT ENDO SERVICE >5YRS: HCPCS | Performed by: INTERNAL MEDICINE

## 2021-02-18 PROCEDURE — 45378 DIAGNOSTIC COLONOSCOPY: CPT | Performed by: INTERNAL MEDICINE

## 2021-02-18 PROCEDURE — 250N000013 HC RX MED GY IP 250 OP 250 PS 637: Performed by: INTERNAL MEDICINE

## 2021-02-18 RX ORDER — ONDANSETRON 2 MG/ML
4 INJECTION INTRAMUSCULAR; INTRAVENOUS EVERY 6 HOURS PRN
Status: CANCELLED | OUTPATIENT
Start: 2021-02-18

## 2021-02-18 RX ORDER — FENTANYL CITRATE 50 UG/ML
INJECTION, SOLUTION INTRAMUSCULAR; INTRAVENOUS PRN
Status: DISCONTINUED | OUTPATIENT
Start: 2021-02-18 | End: 2021-02-18 | Stop reason: HOSPADM

## 2021-02-18 RX ORDER — FLUMAZENIL 0.1 MG/ML
0.2 INJECTION, SOLUTION INTRAVENOUS
Status: CANCELLED | OUTPATIENT
Start: 2021-02-18 | End: 2021-02-18

## 2021-02-18 RX ORDER — NALOXONE HYDROCHLORIDE 0.4 MG/ML
0.4 INJECTION, SOLUTION INTRAMUSCULAR; INTRAVENOUS; SUBCUTANEOUS
Status: CANCELLED | OUTPATIENT
Start: 2021-02-18 | End: 2021-02-19

## 2021-02-18 RX ORDER — NALOXONE HYDROCHLORIDE 0.4 MG/ML
0.2 INJECTION, SOLUTION INTRAMUSCULAR; INTRAVENOUS; SUBCUTANEOUS
Status: CANCELLED | OUTPATIENT
Start: 2021-02-18 | End: 2021-02-19

## 2021-02-18 RX ORDER — LIDOCAINE 40 MG/G
CREAM TOPICAL
Status: DISCONTINUED | OUTPATIENT
Start: 2021-02-18 | End: 2021-02-18 | Stop reason: HOSPADM

## 2021-02-18 RX ORDER — PROCHLORPERAZINE MALEATE 10 MG
10 TABLET ORAL EVERY 6 HOURS PRN
Status: CANCELLED | OUTPATIENT
Start: 2021-02-18

## 2021-02-18 RX ORDER — SIMETHICONE
LIQUID (ML) MISCELLANEOUS PRN
Status: DISCONTINUED | OUTPATIENT
Start: 2021-02-18 | End: 2021-02-18 | Stop reason: HOSPADM

## 2021-02-18 RX ORDER — ONDANSETRON 2 MG/ML
4 INJECTION INTRAMUSCULAR; INTRAVENOUS
Status: DISCONTINUED | OUTPATIENT
Start: 2021-02-18 | End: 2021-02-18 | Stop reason: HOSPADM

## 2021-02-18 RX ORDER — ONDANSETRON 4 MG/1
4 TABLET, ORALLY DISINTEGRATING ORAL EVERY 6 HOURS PRN
Status: CANCELLED | OUTPATIENT
Start: 2021-02-18

## 2021-02-18 NOTE — OR NURSING
Discharge instructions given via cell phone to patient's mother, Evelyne. Evelyne expressed understanding of teaching and feels comfortable caring for patient at home.

## 2021-02-18 NOTE — OR NURSING
Colonoscopy under conscious sedation, procedure stopped at cecum due to severe pain. Pt recommended to reschedule with MAC sedation and longer prep.

## 2021-02-19 ENCOUNTER — TELEPHONE (OUTPATIENT)
Dept: GASTROENTEROLOGY | Facility: CLINIC | Age: 51
End: 2021-02-19

## 2021-02-19 NOTE — TELEPHONE ENCOUNTER
Attempted to contact Tasneem to schedule her colonoscopy with MAC. Unable to leave message as VM is full.     Procedure: Lower Endoscopy with any available provider, under MAC, needs double prep. Next available   Lower Endoscopy Type: Colonoscopy    Purpose of Colonoscopy Procedure: Diagnostic    Colonoscopy reason for referral: iron deficient anemia    Colonoscopy Sedation: Deep/MAC    Preferred Location: [facility] with any available provider, under MAC, needs double prep. Next available.   Scheduling Instructions: If you have not heard from the scheduling office within 2 business days, please call [phone].      Dx: Iron deficiency [E61.1]

## 2021-02-22 ENCOUNTER — TELEPHONE (OUTPATIENT)
Dept: GASTROENTEROLOGY | Facility: CLINIC | Age: 51
End: 2021-02-22

## 2021-02-22 NOTE — TELEPHONE ENCOUNTER
2nd Attempt    Attempted to contact Tasneem to schedule her colonoscopy with MAC. Unable to leave message as VM is full.      Procedure: Lower Endoscopy with any available provider, under MAC, needs double prep. Next available   Lower Endoscopy Type: Colonoscopy     Purpose of Colonoscopy Procedure: Diagnostic     Colonoscopy reason for referral: iron deficient anemia     Colonoscopy Sedation: Deep/MAC     Preferred Location: [facility] with any available provider, under MAC, needs double prep. Next available.   Scheduling Instructions: If you have not heard from the scheduling office within 2 business days, please call [phone].        Dx: Iron deficiency [E61.1]

## 2021-02-25 ENCOUNTER — MYC MEDICAL ADVICE (OUTPATIENT)
Dept: FAMILY MEDICINE | Facility: CLINIC | Age: 51
End: 2021-02-25

## 2021-02-25 DIAGNOSIS — Z12.11 SCREEN FOR COLON CANCER: Primary | ICD-10-CM

## 2021-02-25 NOTE — TELEPHONE ENCOUNTER
Hi Referrals,   Is this in pt insurance plan?[facility]     Thanks.   Jyoti Monk RN

## 2021-02-26 NOTE — TELEPHONE ENCOUNTER
Dr. Tonja Burt at [facility] [address] [address] Appt Line: [phone] Fax: [phone] is in the [facility] ([facility]) network.    [facility] [employer] Referral Rep

## 2021-03-01 ENCOUNTER — MYC MEDICAL ADVICE (OUTPATIENT)
Dept: FAMILY MEDICINE | Facility: CLINIC | Age: 51
End: 2021-03-01

## 2021-03-02 NOTE — TELEPHONE ENCOUNTER
Writer responded via Lydia.  BRITTANY IslasN, RN  [facility]

## 2021-03-20 ENCOUNTER — HEALTH MAINTENANCE LETTER (OUTPATIENT)
Age: 51
End: 2021-03-20

## 2021-05-20 DIAGNOSIS — D50.9 IRON DEFICIENCY ANEMIA, UNSPECIFIED IRON DEFICIENCY ANEMIA TYPE: ICD-10-CM

## 2021-05-20 LAB
BASOPHILS # BLD AUTO: 0 10E9/L (ref 0–0.2)
BASOPHILS NFR BLD AUTO: 0.1 %
DIFFERENTIAL METHOD BLD: NORMAL
EOSINOPHIL # BLD AUTO: 0.1 10E9/L (ref 0–0.7)
EOSINOPHIL NFR BLD AUTO: 1.2 %
ERYTHROCYTE [DISTWIDTH] IN BLOOD BY AUTOMATED COUNT: 13.8 % (ref 10–15)
FERRITIN SERPL-MCNC: 20 NG/ML (ref 8–252)
HCT VFR BLD AUTO: 43.1 % (ref 35–47)
HGB BLD-MCNC: 13.7 G/DL (ref 11.7–15.7)
IRON SATN MFR SERPL: 26 % (ref 15–46)
IRON SERPL-MCNC: 86 UG/DL (ref 35–180)
LYMPHOCYTES # BLD AUTO: 1.4 10E9/L (ref 0.8–5.3)
LYMPHOCYTES NFR BLD AUTO: 18.7 %
MCH RBC QN AUTO: 28.7 PG (ref 26.5–33)
MCHC RBC AUTO-ENTMCNC: 31.8 G/DL (ref 31.5–36.5)
MCV RBC AUTO: 90 FL (ref 78–100)
MONOCYTES # BLD AUTO: 0.7 10E9/L (ref 0–1.3)
MONOCYTES NFR BLD AUTO: 8.9 %
NEUTROPHILS # BLD AUTO: 5.2 10E9/L (ref 1.6–8.3)
NEUTROPHILS NFR BLD AUTO: 71.1 %
PLATELET # BLD AUTO: 313 10E9/L (ref 150–450)
RBC # BLD AUTO: 4.78 10E12/L (ref 3.8–5.2)
TIBC SERPL-MCNC: 331 UG/DL (ref 240–430)
WBC # BLD AUTO: 7.3 10E9/L (ref 4–11)

## 2021-05-20 PROCEDURE — 83550 IRON BINDING TEST: CPT | Performed by: FAMILY MEDICINE

## 2021-05-20 PROCEDURE — 82728 ASSAY OF FERRITIN: CPT | Performed by: FAMILY MEDICINE

## 2021-05-20 PROCEDURE — 83540 ASSAY OF IRON: CPT | Performed by: FAMILY MEDICINE

## 2021-05-20 PROCEDURE — 85025 COMPLETE CBC W/AUTO DIFF WBC: CPT | Performed by: FAMILY MEDICINE

## 2021-05-20 PROCEDURE — 36415 COLL VENOUS BLD VENIPUNCTURE: CPT | Performed by: FAMILY MEDICINE

## 2021-05-21 NOTE — RESULT ENCOUNTER NOTE
Inder Boateng,  Yay!  This is so much better.  Your hemoglobin is normal and you have replaced your iron stores.  We usually like to see the ferritin a bit higher (> 50) so you might want to keep taking the iron supplements for a while longer, but these results are great.     Gem Yee MD

## 2021-07-26 ENCOUNTER — TELEPHONE (OUTPATIENT)
Dept: CARDIOLOGY | Facility: CLINIC | Age: 51
End: 2021-07-26

## 2021-07-26 NOTE — TELEPHONE ENCOUNTER
[facility] Call Center    Phone Message    May a detailed message be left on voicemail: yes     Reason for Call: Other: Pt called in stating she needs the order for a ct angiogram placed, as current one has . She is also unsure if she would need to see Dr. Haile again before having the CT angio. Please call her back to discuss.      Action Taken: Message routed to:  [facility] Cardio    Travel Screening: Not Applicable

## 2021-07-26 NOTE — TELEPHONE ENCOUNTER
Spoke to patient, her symptoms had subsided since last visit but have now returned.  Order extended for one year. She will call to schedule and once results are compiled and reivewed she will be notified.

## 2021-07-28 ENCOUNTER — TELEPHONE (OUTPATIENT)
Dept: CARDIOLOGY | Facility: CLINIC | Age: 51
End: 2021-07-28

## 2021-07-28 DIAGNOSIS — R07.9 CHEST PAIN, UNSPECIFIED TYPE: ICD-10-CM

## 2021-07-28 DIAGNOSIS — R06.02 SOB (SHORTNESS OF BREATH): Primary | ICD-10-CM

## 2021-07-28 NOTE — TELEPHONE ENCOUNTER
"Pt is calling to schedule her angiogram, the note below from marcia states \"patient will call to schedule\", however the call center does not schedule angiograms and protocol states to send encounter to clinic coordinator, patient voiced her frustration to getting the run-a-round, please call Tasneem lamar to schedule her angiogram, thank you  "

## 2021-07-28 NOTE — TELEPHONE ENCOUNTER
Spoke to patient, apologized that she was not transferred to imaging scheduling by Jeannette Chang and I gave her the number for imaging scheduling.

## 2021-08-16 ENCOUNTER — HOSPITAL ENCOUNTER (EMERGENCY)
Facility: CLINIC | Age: 51
Discharge: LEFT BEFORE DISCHARGE | End: 2021-08-16
Payer: COMMERCIAL

## 2021-08-16 ENCOUNTER — HOSPITAL ENCOUNTER (OUTPATIENT)
Dept: CT IMAGING | Facility: CLINIC | Age: 51
End: 2021-08-16
Attending: INTERNAL MEDICINE
Payer: COMMERCIAL

## 2021-08-16 VITALS — DIASTOLIC BLOOD PRESSURE: 68 MMHG | SYSTOLIC BLOOD PRESSURE: 109 MMHG | HEART RATE: 63 BPM

## 2021-08-16 DIAGNOSIS — R06.02 SOB (SHORTNESS OF BREATH): ICD-10-CM

## 2021-08-16 DIAGNOSIS — R07.9 CHEST PAIN, UNSPECIFIED TYPE: ICD-10-CM

## 2021-08-16 PROCEDURE — 75574 CT ANGIO HRT W/3D IMAGE: CPT | Mod: 26 | Performed by: STUDENT IN AN ORGANIZED HEALTH CARE EDUCATION/TRAINING PROGRAM

## 2021-08-16 PROCEDURE — 250N000013 HC RX MED GY IP 250 OP 250 PS 637: Performed by: INTERNAL MEDICINE

## 2021-08-16 PROCEDURE — 250N000011 HC RX IP 250 OP 636: Performed by: INTERNAL MEDICINE

## 2021-08-16 PROCEDURE — 75574 CT ANGIO HRT W/3D IMAGE: CPT

## 2021-08-16 PROCEDURE — 250N000011 HC RX IP 250 OP 636: Performed by: STUDENT IN AN ORGANIZED HEALTH CARE EDUCATION/TRAINING PROGRAM

## 2021-08-16 RX ORDER — IVABRADINE 5 MG/1
5-15 TABLET, FILM COATED ORAL
Status: COMPLETED | OUTPATIENT
Start: 2021-08-16 | End: 2021-08-16

## 2021-08-16 RX ORDER — METOPROLOL TARTRATE 1 MG/ML
5-15 INJECTION, SOLUTION INTRAVENOUS
Status: DISCONTINUED | OUTPATIENT
Start: 2021-08-16 | End: 2021-08-17 | Stop reason: HOSPADM

## 2021-08-16 RX ORDER — ONDANSETRON 2 MG/ML
4 INJECTION INTRAMUSCULAR; INTRAVENOUS
Status: DISCONTINUED | OUTPATIENT
Start: 2021-08-16 | End: 2021-08-17 | Stop reason: HOSPADM

## 2021-08-16 RX ORDER — DIPHENHYDRAMINE HCL 25 MG
25 CAPSULE ORAL
Status: DISCONTINUED | OUTPATIENT
Start: 2021-08-16 | End: 2021-08-17 | Stop reason: HOSPADM

## 2021-08-16 RX ORDER — NITROGLYCERIN 0.4 MG/1
.4-.8 TABLET SUBLINGUAL
Status: DISCONTINUED | OUTPATIENT
Start: 2021-08-16 | End: 2021-08-17 | Stop reason: HOSPADM

## 2021-08-16 RX ORDER — METHYLPREDNISOLONE SODIUM SUCCINATE 125 MG/2ML
125 INJECTION, POWDER, LYOPHILIZED, FOR SOLUTION INTRAMUSCULAR; INTRAVENOUS
Status: COMPLETED | OUTPATIENT
Start: 2021-08-16 | End: 2021-08-16

## 2021-08-16 RX ORDER — DIPHENHYDRAMINE HYDROCHLORIDE 50 MG/ML
25-50 INJECTION INTRAMUSCULAR; INTRAVENOUS
Status: COMPLETED | OUTPATIENT
Start: 2021-08-16 | End: 2021-08-16

## 2021-08-16 RX ORDER — ACYCLOVIR 200 MG/1
0-1 CAPSULE ORAL
Status: DISCONTINUED | OUTPATIENT
Start: 2021-08-16 | End: 2021-08-17 | Stop reason: HOSPADM

## 2021-08-16 RX ORDER — IOPAMIDOL 755 MG/ML
120 INJECTION, SOLUTION INTRAVASCULAR ONCE
Status: COMPLETED | OUTPATIENT
Start: 2021-08-16 | End: 2021-08-16

## 2021-08-16 RX ADMIN — METHYLPREDNISOLONE SODIUM SUCCINATE 125 MG: 125 INJECTION, POWDER, FOR SOLUTION INTRAMUSCULAR; INTRAVENOUS at 14:43

## 2021-08-16 RX ADMIN — IVABRADINE 15 MG: 5 TABLET, FILM COATED ORAL at 12:26

## 2021-08-16 RX ADMIN — METOPROLOL TARTRATE 100 MG: 100 TABLET ORAL at 12:31

## 2021-08-16 RX ADMIN — DIPHENHYDRAMINE HYDROCHLORIDE 25 MG: 50 INJECTION INTRAMUSCULAR; INTRAVENOUS at 14:38

## 2021-08-16 RX ADMIN — IOPAMIDOL 105 ML: 755 INJECTION, SOLUTION INTRAVENOUS at 13:50

## 2021-08-16 RX ADMIN — NITROGLYCERIN 0.8 MG: 0.4 TABLET SUBLINGUAL at 13:57

## 2021-08-16 NOTE — PROGRESS NOTES
Pt arrived for Coronary CT angiogram. Test, meds and side effects reviewed with pt.  Resting HR  83  bpm. Given 100 mg PO Metoprolol. Unable to swallow pills so patient was not able to take Ivabradine per verbal order. Administered 0.8 mg SL nitro  on CTA table per order. CTA completed.  Patient tolerated procedure well and denies symptoms of allergic reaction.  Post monitoring completed and VSS.  D/C instructions reviewed with pt whom verbalized understanding of need to increase PO fluids today. D/C to gold waiting room accompanied by staff.

## 2021-08-16 NOTE — PROGRESS NOTES
When patient returned to room , sat and drank and ate snacks and started to feeling itching sensation on back. Noticed a slight hive on each side. Physician aware and 25mg benadryl IV and 125mg solu-medrol IV given  per order  Pt relaxing, does not feel anymore itching.

## 2021-08-17 ENCOUNTER — PATIENT OUTREACH (OUTPATIENT)
Dept: FAMILY MEDICINE | Facility: CLINIC | Age: 51
End: 2021-08-17

## 2021-09-04 ENCOUNTER — HEALTH MAINTENANCE LETTER (OUTPATIENT)
Age: 51
End: 2021-09-04

## 2022-02-19 ENCOUNTER — HEALTH MAINTENANCE LETTER (OUTPATIENT)
Age: 52
End: 2022-02-19

## 2022-02-27 ENCOUNTER — OFFICE VISIT (OUTPATIENT)
Dept: URGENT CARE | Facility: URGENT CARE | Age: 52
End: 2022-02-27
Payer: COMMERCIAL

## 2022-02-27 VITALS
TEMPERATURE: 98.1 F | SYSTOLIC BLOOD PRESSURE: 138 MMHG | HEART RATE: 59 BPM | OXYGEN SATURATION: 97 % | DIASTOLIC BLOOD PRESSURE: 81 MMHG | BODY MASS INDEX: 18.78 KG/M2 | WEIGHT: 110 LBS | HEIGHT: 64 IN

## 2022-02-27 DIAGNOSIS — J01.00 SUBACUTE MAXILLARY SINUSITIS: Primary | ICD-10-CM

## 2022-02-27 PROCEDURE — 99213 OFFICE O/P EST LOW 20 MIN: CPT | Performed by: FAMILY MEDICINE

## 2022-02-27 RX ORDER — LORATADINE 10 MG/1
10 TABLET ORAL DAILY
COMMUNITY

## 2022-02-27 RX ORDER — PSEUDOEPHEDRINE HCL 30 MG
TABLET ORAL EVERY 4 HOURS PRN
COMMUNITY
End: 2023-12-28

## 2022-02-27 RX ORDER — AMOXICILLIN AND CLAVULANATE POTASSIUM 400; 57 MG/1; MG/1
2 TABLET, CHEWABLE ORAL 2 TIMES DAILY
Qty: 84 TABLET | Refills: 0 | Status: SHIPPED | OUTPATIENT
Start: 2022-02-27 | End: 2022-03-20

## 2022-02-27 NOTE — PROGRESS NOTES
Subjective: Patient wonders if she had Covid at the beginning of the pandemic because she had a really bad respiratory infection and almost feels like she has never recovered from it.  It led to some chronic shortness of breath and CT angiogram never would find anything to explain it but ever since then she has had pain in the left side in her sinuses, get some relief with Sudafed, has not really gone to a doctor about it but in the last few weeks things started feeling worse and in the last few days she also developed some abdominal pain and nausea.  She did a Covid test 2 days ago and just got the results and they were negative.  She does not get a lot of mucus out but when she does it tends to be thick and often bloody.    Objective: ENT with some pain over the left maxillary sinuses and pressure with tapping on the back of her head.  Neck is normal, slightly tender.  Lungs are clear.  Heart is regular without murmurs.  Abdomen benign.    Assessment and plan: This office with a chronic sinus infection with an exacerbation.  I believe it may take a 3-week course of antibiotics.  She is unable to swallow pills so I gave her chewable Augmentin to take 2 pills twice a day for 3 weeks.  If this is not dramatically helpful within a week it may be worth reevaluating and perhaps getting a CAT scan of her sinuses.  This may well be Covid related, from 2 years ago, but it is impossible to know.

## 2022-03-14 ENCOUNTER — OFFICE VISIT (OUTPATIENT)
Dept: OBGYN | Facility: CLINIC | Age: 52
End: 2022-03-14
Payer: COMMERCIAL

## 2022-03-14 VITALS
HEIGHT: 64 IN | HEART RATE: 91 BPM | BODY MASS INDEX: 18.27 KG/M2 | SYSTOLIC BLOOD PRESSURE: 125 MMHG | WEIGHT: 107 LBS | DIASTOLIC BLOOD PRESSURE: 75 MMHG

## 2022-03-14 DIAGNOSIS — Z13.220 SCREENING CHOLESTEROL LEVEL: ICD-10-CM

## 2022-03-14 DIAGNOSIS — Z01.419 WELL WOMAN EXAM WITH ROUTINE GYNECOLOGICAL EXAM: Primary | ICD-10-CM

## 2022-03-14 DIAGNOSIS — Z12.4 CERVICAL CANCER SCREENING: ICD-10-CM

## 2022-03-14 DIAGNOSIS — Z13.1 SCREENING FOR DIABETES MELLITUS: ICD-10-CM

## 2022-03-14 DIAGNOSIS — N92.0 MENORRHAGIA WITH REGULAR CYCLE: ICD-10-CM

## 2022-03-14 DIAGNOSIS — Z12.31 ENCOUNTER FOR SCREENING MAMMOGRAM FOR BREAST CANCER: ICD-10-CM

## 2022-03-14 DIAGNOSIS — D64.9 ANEMIA, UNSPECIFIED TYPE: ICD-10-CM

## 2022-03-14 DIAGNOSIS — Z12.11 COLON CANCER SCREENING: ICD-10-CM

## 2022-03-14 PROCEDURE — G0145 SCR C/V CYTO,THINLAYER,RESCR: HCPCS | Performed by: OBSTETRICS & GYNECOLOGY

## 2022-03-14 PROCEDURE — 99386 PREV VISIT NEW AGE 40-64: CPT | Performed by: OBSTETRICS & GYNECOLOGY

## 2022-03-14 PROCEDURE — 87624 HPV HI-RISK TYP POOLED RSLT: CPT | Performed by: OBSTETRICS & GYNECOLOGY

## 2022-03-14 ASSESSMENT — PATIENT HEALTH QUESTIONNAIRE - PHQ9
SUM OF ALL RESPONSES TO PHQ QUESTIONS 1-9: 0
5. POOR APPETITE OR OVEREATING: NOT AT ALL

## 2022-03-14 ASSESSMENT — ANXIETY QUESTIONNAIRES
GAD7 TOTAL SCORE: 0
2. NOT BEING ABLE TO STOP OR CONTROL WORRYING: NOT AT ALL
7. FEELING AFRAID AS IF SOMETHING AWFUL MIGHT HAPPEN: NOT AT ALL
1. FEELING NERVOUS, ANXIOUS, OR ON EDGE: NOT AT ALL
6. BECOMING EASILY ANNOYED OR IRRITABLE: NOT AT ALL
5. BEING SO RESTLESS THAT IT IS HARD TO SIT STILL: NOT AT ALL
IF YOU CHECKED OFF ANY PROBLEMS ON THIS QUESTIONNAIRE, HOW DIFFICULT HAVE THESE PROBLEMS MADE IT FOR YOU TO DO YOUR WORK, TAKE CARE OF THINGS AT HOME, OR GET ALONG WITH OTHER PEOPLE: NOT DIFFICULT AT ALL
3. WORRYING TOO MUCH ABOUT DIFFERENT THINGS: NOT AT ALL

## 2022-03-14 NOTE — PATIENT INSTRUCTIONS
Patient Education     Prevention Guidelines, Women Ages 40 to 49  Screening tests and vaccines are an important part of managing your health. A screening test is done to find diseases in people who don't have any symptoms. The goal is to find a disease early so lifestyle changes and checkups can reduce the risk of disease. Or the goal may be to detect it early to treat it most effectively. Screening tests are not used to diagnose a disease. But they are used to see if more testing is needed. Health counseling is important, too. Below are guidelines for these, for women ages 40 to 49. Talk with your healthcare provider to make sure you re up-to-date on what you need.  Screening Who needs it How often   Type 2 diabetes or prediabetes All women beginning at age 45 and women without symptoms at any age who are overweight or obese and have 1 or more additional risk factors for diabetes At least every 3 years1   Type 2 diabetes or prediabetes All women diagnosed with gestational diabetes Lifelong testing every 3 years   Type 2 diabetes All women with prediabetes Every year   Alcohol misuse All women in this age group At routine exams   Blood pressure All women in this age group Yearly checkup if your blood pressure is normal  Normal blood pressure is less than 120/80 mm Hg  If your blood pressure reading is higher than normal, follow the advice of your healthcare provider   Breast cancer All women at average risk in this age group Screening with a mammogram can start at age 40.2 Talk with your healthcare provider to help you decide when to start screening. At age 45 start yearly mammograms.3   Cervical cancer All women in this age group, except women who have had a complete hysterectomy Pap test every 3 years or Pap test plus human papilloma virus (HPV) test every 5 years   Colorectal cancer Women age 45 years and older at average risk Multiple tests are available and are used at different times. Possible tests  include:    Flexible sigmoidoscopy every 5 years, or    Colonoscopy every 10 years, or    CT colonography (virtual colonoscopy) every 5 years, or    Yearly fecal occult blood test, or    Yearly fecal immunochemical test every year, or    Stool DNA test, every 3 years  If you choose a test other than a colonoscopy and have an abnormal test result, you will need to follow-up with a colonoscopy. Screening advice varies among expert groups. Talk with your healthcare provider about which tests are best for you.  Some people should be screened using a different schedule because of their personal or family health history. Talk with your healthcare provider about your health history.   Chlamydia Women at increased risk for infection At routine exams if you're at risk or have symptoms   Depression All women in this age group At routine exams   Gonorrhea Sexually active women at increased risk for infection At routine exams   Hepatitis C Anyone at increased risk; 1 time for those born between 1945 and 1965 At routine exams   High cholesterol or triglycerides All women ages 45 and older who are at risk for coronary artery disease; younger women, talk with your healthcare provider At least every 5 years   HIV All women At routine exams. Those with risk factors for HIV should be tested at least annually.   Obesity All women in this age group At routine exams   Syphilis Women at increased risk for infection-talk with your healthcare provider At routine exams   Tuberculosis Women at increased risk for infection-talk with your healthcare provider Ask your healthcare provider   Vision All women in this age group Complete exam at age 40 and eye exams every 2 to 4 years. If you have a chronic disease, ask your healthcare provider how often you should have your eyes examined.4   Vaccine Who needs it How often   Chickenpox (varicella) All women in this age group who have no record of this infection or vaccine 2 doses; the second dose  should be given at least 4 weeks after the first dose   Hepatitis A Women at increased risk for infection-talk with your healthcare provider 2 doses given 6 months apart   Hepatitis B Women at increased risk for infection-talk with your healthcare provider 3 doses over 6 months; second dose should be given 1 month after the first dose; the third dose should be given at least 2 months after the second dose and at least 4 months after the first dose   Haemophilus influenzae Type B (HIB) Women at increased risk 1 to 3 doses   Influenza (flu) All women in this age group Once a year   Measles, mumps, rubella (MMR) All women in this age group who have no record of these infections or vaccines 1 or 2 doses   Meningococcal Women at increased risk for infection-talk with your healthcare provider 1 or more doses   Pneumococcal conjugate vaccine (PCV13) and pneumococcal polysaccharide vaccine (PPSV23) Women at increased risk for infection-talk with your healthcare provider 1 or 2 doses   Tetanus/diphtheria/pertussis (Td/Tdap) booster All women in this age group A 1-time dose of Tdap instead of a Td booster after age 18, then Td every 10 years   Counseling Who needs it How often   BRCA gene mutation testing for breast and ovarian cancer susceptibility Women with increased risk for having gene mutation When your risk is known   Breast cancer and chemoprevention Women at high risk for breast cancer When your risk is known   Diet and exercise Women who are overweight or obese When diagnosed, and then at routine exams   Domestic violence Women at the age in which they are able to have children At routine exams   Sexually transmitted infection prevention Women at increased risk for infection-talk with your healthcare provider At routine exams   Use of tobacco and the health effects it can cause All women in this age group Every exam   1 American Diabetes Association  2 American College of Obstetricians and Gynecologists   3 American  Cancer Society  4 American Academy of Ophthalmology  [employer] last reviewed this educational content on 11/1/2017 2000-2021 [employer]. All rights reserved. This information is not intended as a substitute for professional medical care. Always follow your healthcare professional's instructions.           Patient Education     Mirena Intrauterine System 52 mg (0.826452 MG/HR)  Uses  This medicine is used for the following purposes:    birth control    menstrual bleeding  Instructions  A negative pregnancy test may be needed before receiving this medicine.  A second form of birth control may be needed for the first week after the blaine is placed. Ask your doctor or pharmacist about the need for back-up birth control.  Please tell your doctor and pharmacist about all the medicines you take. Include both prescription and over-the-counter medicines. Also tell them about any vitamins, herbal medicines, or anything else you take for your health.  The IUD should be removed after 5 years. After removal, a new IUD can be inserted if treatment is to be continued.  It is very important that you keep all appointments for medical exams and tests while on this medicine.  Cautions  Some patients taking this medicine have experienced serious side effects. Please speak with your doctor to understand the risks and benefits associated with this medicine.  Tell your doctor and pharmacist if you ever had an allergic reaction to a medicine. Symptoms of an allergic reaction can include trouble breathing, skin rash, itching, swelling, or severe dizziness.  Ask your doctor to show you how to perform a self breast examination. You should check your breasts once a month and report any changes to your doctor.  Talk to your doctor about getting a complete physical exam every year while on this medicine.  Check regularly to make sure the IUD is in the proper place. Contact your doctor right away if the IUD comes out or if you can  not feel the threads.  Tell the doctor or pharmacist if you are pregnant, planning to be pregnant, or breastfeeding.  Do not use this medicine if you are pregnant. If you become pregnant while on this medicine, contact your doctor immediately.  This medicine does not protect you or your partner against sexually transmitted diseases.  Ask your pharmacist if this medicine can interact with any of your other medicines. Be sure to tell them about all the medicines you take.  Please tell all your doctors and dentists that you are on this medicine before they provide care.  Do not start or stop any other medicines without first speaking to your doctor or pharmacist.  Side Effects  The following is a list of some common side effects from this medicine. Please speak with your doctor about what you should do if you experience these or other side effects.    breast pain or swelling    nausea    cramping of the uterus or bleeding from the vagina    vaginal bleeding or spotting between periods    weight gain  Call your doctor or get medical help right away if you notice any of these more serious side effects:    severe abdominal or pelvic pain    breast lumps    depression or feeling sad    fever or chills    severe or persistent headache    mood changes    pain during sexual intercourse    dark urine    vaginal itching or discharge    severe or persistent vomiting    yellowing of the eyes    yellowing of the skin  A few people may have an allergic reactions to this medicine. Symptoms can include difficulty breathing, skin rash, itching, swelling, or severe dizziness. If you notice any of these symptoms, seek medical help quickly.  Extra  Please speak with your doctor, nurse, or pharmacist if you have any questions about this medicine.  [email or web address]  IMPORTANT NOTE: This document tells you briefly how to take your medicine, but it does not tell you all there is to know about it.Your doctor or  pharmacist may give you other documents about your medicine. Please talk to them if you have any questions.Always follow their advice. There is a more complete description of this medicine available in English.Scan this code on your smartphone or tablet or use the web address below. You can also ask your pharmacist for a printout. If you have any questions, please ask your pharmacist.     2021 [employer].

## 2022-03-15 ASSESSMENT — ANXIETY QUESTIONNAIRES: GAD7 TOTAL SCORE: 0

## 2022-03-16 LAB
BKR LAB AP GYN ADEQUACY: NORMAL
BKR LAB AP GYN INTERPRETATION: NORMAL
BKR LAB AP HPV REFLEX: NORMAL
BKR LAB AP LMP: NORMAL
BKR LAB AP PREVIOUS ABNORMAL: NORMAL
PATH REPORT.COMMENTS IMP SPEC: NORMAL
PATH REPORT.COMMENTS IMP SPEC: NORMAL
PATH REPORT.RELEVANT HX SPEC: NORMAL

## 2022-03-18 LAB
HUMAN PAPILLOMA VIRUS 16 DNA: NEGATIVE
HUMAN PAPILLOMA VIRUS 18 DNA: NEGATIVE
HUMAN PAPILLOMA VIRUS FINAL DIAGNOSIS: NORMAL
HUMAN PAPILLOMA VIRUS OTHER HR: NEGATIVE

## 2022-03-29 DIAGNOSIS — N93.9 ABNORMAL UTERINE BLEEDING (AUB): Primary | ICD-10-CM

## 2022-04-04 ENCOUNTER — ANCILLARY PROCEDURE (OUTPATIENT)
Dept: MAMMOGRAPHY | Facility: CLINIC | Age: 52
End: 2022-04-04
Attending: OBSTETRICS & GYNECOLOGY
Payer: COMMERCIAL

## 2022-04-04 DIAGNOSIS — Z12.31 ENCOUNTER FOR SCREENING MAMMOGRAM FOR BREAST CANCER: ICD-10-CM

## 2022-04-04 PROCEDURE — 77067 SCR MAMMO BI INCL CAD: CPT | Mod: GC | Performed by: RADIOLOGY

## 2022-04-13 ENCOUNTER — LAB (OUTPATIENT)
Dept: LAB | Facility: CLINIC | Age: 52
End: 2022-04-13
Payer: COMMERCIAL

## 2022-04-13 DIAGNOSIS — Z13.1 SCREENING FOR DIABETES MELLITUS: ICD-10-CM

## 2022-04-13 DIAGNOSIS — D64.9 ANEMIA, UNSPECIFIED TYPE: ICD-10-CM

## 2022-04-13 DIAGNOSIS — Z13.220 SCREENING CHOLESTEROL LEVEL: ICD-10-CM

## 2022-04-13 LAB
CHOLEST SERPL-MCNC: 218 MG/DL
ERYTHROCYTE [DISTWIDTH] IN BLOOD BY AUTOMATED COUNT: 12.7 % (ref 10–15)
FASTING STATUS PATIENT QL REPORTED: ABNORMAL
HBA1C MFR BLD: 5.3 % (ref 0–5.6)
HCT VFR BLD AUTO: 41.6 % (ref 35–47)
HDLC SERPL-MCNC: 95 MG/DL
HGB BLD-MCNC: 13.5 G/DL (ref 11.7–15.7)
LDLC SERPL CALC-MCNC: 114 MG/DL
MCH RBC QN AUTO: 28.8 PG (ref 26.5–33)
MCHC RBC AUTO-ENTMCNC: 32.5 G/DL (ref 31.5–36.5)
MCV RBC AUTO: 89 FL (ref 78–100)
NONHDLC SERPL-MCNC: 123 MG/DL
PLATELET # BLD AUTO: 326 10E3/UL (ref 150–450)
RBC # BLD AUTO: 4.69 10E6/UL (ref 3.8–5.2)
TRIGL SERPL-MCNC: 45 MG/DL
WBC # BLD AUTO: 5.7 10E3/UL (ref 4–11)

## 2022-04-13 PROCEDURE — 36415 COLL VENOUS BLD VENIPUNCTURE: CPT

## 2022-04-13 PROCEDURE — 80061 LIPID PANEL: CPT

## 2022-04-13 PROCEDURE — 85027 COMPLETE CBC AUTOMATED: CPT

## 2022-04-13 PROCEDURE — 83036 HEMOGLOBIN GLYCOSYLATED A1C: CPT

## 2022-04-22 ENCOUNTER — OFFICE VISIT (OUTPATIENT)
Dept: OBGYN | Facility: CLINIC | Age: 52
End: 2022-04-22
Attending: OBSTETRICS & GYNECOLOGY
Payer: COMMERCIAL

## 2022-04-22 VITALS
WEIGHT: 109.3 LBS | BODY MASS INDEX: 18.76 KG/M2 | DIASTOLIC BLOOD PRESSURE: 81 MMHG | HEART RATE: 88 BPM | SYSTOLIC BLOOD PRESSURE: 126 MMHG

## 2022-04-22 DIAGNOSIS — N93.9 ABNORMAL UTERINE BLEEDING (AUB): Primary | ICD-10-CM

## 2022-04-22 LAB — HCG UR QL: NEGATIVE

## 2022-04-22 PROCEDURE — 58100 BIOPSY OF UTERUS LINING: CPT | Performed by: OBSTETRICS & GYNECOLOGY

## 2022-04-22 PROCEDURE — 81025 URINE PREGNANCY TEST: CPT | Performed by: OBSTETRICS & GYNECOLOGY

## 2022-04-22 PROCEDURE — 99213 OFFICE O/P EST LOW 20 MIN: CPT | Mod: 25 | Performed by: OBSTETRICS & GYNECOLOGY

## 2022-04-22 PROCEDURE — 88305 TISSUE EXAM BY PATHOLOGIST: CPT | Performed by: PATHOLOGY

## 2022-04-22 RX ORDER — DESOGESTREL AND ETHINYL ESTRADIOL 0.15-0.03
1 KIT ORAL DAILY
Qty: 90 TABLET | Refills: 3 | Status: SHIPPED | OUTPATIENT
Start: 2022-04-22 | End: 2023-08-16

## 2022-04-22 NOTE — PROGRESS NOTES
[facility]- OBGYN    CC: eval for abnormal uterine bleeding     S:Tasneem Mccollum is a 51 year old  who presents today for evaluation of irregular recent bleeding.    Patient sent [employer] [facility] message on 3/28/22 with report of light bleeding and pelvic cramping around ovulation time.  She reports it was bright red and light flow.    Patient reports today she was thinking more about and wonders if the bleeding could be from friction or tearing during intercourse.  She had intercourse the night before the bleeding was noted and she felt there was less lubrication.  She also notes that her period was a little late this month, about a week.      OBGYN Hx:   OB History    Para Term  AB Living   3 3 3 0 0 3   SAB IAB Ectopic Multiple Live Births   0 0 0 0 0      # Outcome Date GA Lbr Lex/2nd Weight Sex Delivery Anes PTL Lv   3 Term            2 Term            1 Term              Patient's last menstrual period was 2022 (exact date).  Menses: see HPI    Past Medical History:   Diagnosis Date     Bone disorder     Low bone mass for age     Cyst of left ovary 2015     Menarche 15 years old   Patient denies any history of hypertension, VTE, and migraine with aura   Current Outpatient Medications   Medication     desogestrel-ethinyl estradiol (APRI) 0.15-30 MG-MCG tablet     acetaminophen (TYLENOL) 325 MG tablet     cetirizine (ZYRTEC) 10 MG tablet     fluticasone (FLONASE) 50 MCG/ACT spray     loratadine (CLARITIN) 10 MG tablet     pseudoePHEDrine (SUDAFED) 30 MG tablet     No current facility-administered medications for this visit.     Allergies: dust mites    O: Patient Vitals for the past 24 hrs:   BP Pulse Weight   22 0952 126/81 88 49.6 kg (109 lb 4.8 oz)   ]  Exam:  General- awake, alert, answering questions appropriately, appears comfortable  CV- regular rate  Lung- breathing comfortably on room air  - normal appearing external genitalia, normal appearing  vaginal mucosa, normal appearing cervix, small amount of dark blood in vaginal vault  Results for orders placed or performed in visit on 22   HCG Qual, Urine ([id number])     Status: Normal   Result Value Ref Range    hCG Urine Qualitative Negative Negative         A&P: Tasneem Mccollum is a 51 year old  who presents today evaluation of AUB.      (N93.9) Abnormal uterine bleeding (AUB)  (primary encounter diagnosis)  Comment: Reviewed bleeding with patient.  Discussed that given patient age >45 and irregular recent bleeding, an evaluation is warranted.  This evaluation should include an endometrial biopsy to rule out pre-cancer or cancer cells in the endometrium contributing to abnormal bleeding.  The patient previously had an ultrasound today to evaluate for structural causes of bleeding, but she cancelled this scan because she was under the impression it was a ordered because she had previously planned IUD insertion.  On exam there was no signs of vaginal trauma or bleeding from a vaginal source.  Discussed regulating menses with OCPs previously as patient would like to avoid using IUD at this time.  Discussed indications for endometrial biopsy.  Discussed with patient alternative to EMB today would be pelvic ultrasound and if endometrial polyp or fibroid were found that would be addressed with hysteroscopy, endometrial sampling could be done during that procedure.  Following extensive discussion the patient opted for EMB today and start OCPs.  If persistent abnormal bleeding would then proceed with pelvic ultrasound evaluation.  Plan: HCG Qual, Urine ([id number]), desogestrel-ethinyl         estradiol (APRI) 0.15-30 MG-MCG tablet,         ENDOMETRIAL BIOPSY W/O CERVICAL DILATION,         Surgical Pathology Exam    PROCEDURE: Endometrial biopsy  Reviewed indications, alternatives, benefits, and risks including risk of inadequate specimen.  Questions and concerns addressed.  Consent signed.  Pregnancy  test verified negative.    Patient in dorsal lithotomy position.  Medium speculum placed with excellent visualization of cervix.  Cervix cleansed with betadine swabs x3.  Cervix grasped with single tooth tenaculum.  Endometrial pipelle inserted with moderate return of tissue on two passes.  Tenaculum removed and sites hemostatic. Speculum removed.  PEPE Ridley present for entire procedure and exam.  Patient given juice, crackers, and 400mg of ibuprofen following procedure.  Recovered well.      Kamilla Burk MD

## 2022-04-27 LAB
PATH REPORT.COMMENTS IMP SPEC: NORMAL
PATH REPORT.COMMENTS IMP SPEC: NORMAL
PATH REPORT.FINAL DX SPEC: NORMAL
PATH REPORT.GROSS SPEC: NORMAL
PATH REPORT.MICROSCOPIC SPEC OTHER STN: NORMAL
PATH REPORT.RELEVANT HX SPEC: NORMAL
PHOTO IMAGE: NORMAL

## 2022-05-01 ENCOUNTER — OFFICE VISIT (OUTPATIENT)
Dept: URGENT CARE | Facility: URGENT CARE | Age: 52
End: 2022-05-01
Payer: COMMERCIAL

## 2022-05-01 VITALS
HEIGHT: 64 IN | OXYGEN SATURATION: 97 % | TEMPERATURE: 99 F | BODY MASS INDEX: 18.61 KG/M2 | WEIGHT: 109 LBS | HEART RATE: 89 BPM | SYSTOLIC BLOOD PRESSURE: 117 MMHG | DIASTOLIC BLOOD PRESSURE: 83 MMHG

## 2022-05-01 DIAGNOSIS — R30.0 DYSURIA: Primary | ICD-10-CM

## 2022-05-01 LAB
ALBUMIN UR-MCNC: NEGATIVE MG/DL
APPEARANCE UR: CLEAR
BILIRUB UR QL STRIP: NEGATIVE
COLOR UR AUTO: YELLOW
GLUCOSE UR STRIP-MCNC: NEGATIVE MG/DL
HGB UR QL STRIP: NEGATIVE
KETONES UR STRIP-MCNC: NEGATIVE MG/DL
LEUKOCYTE ESTERASE UR QL STRIP: NEGATIVE
NITRATE UR QL: NEGATIVE
PH UR STRIP: 7 [PH] (ref 5–7)
RBC #/AREA URNS AUTO: NORMAL /HPF
SP GR UR STRIP: 1.01 (ref 1–1.03)
UROBILINOGEN UR STRIP-ACNC: 0.2 E.U./DL
WBC #/AREA URNS AUTO: NORMAL /HPF

## 2022-05-01 PROCEDURE — 87086 URINE CULTURE/COLONY COUNT: CPT | Performed by: PHYSICIAN ASSISTANT

## 2022-05-01 PROCEDURE — 81001 URINALYSIS AUTO W/SCOPE: CPT

## 2022-05-01 PROCEDURE — 99213 OFFICE O/P EST LOW 20 MIN: CPT | Performed by: PHYSICIAN ASSISTANT

## 2022-05-01 PROCEDURE — 87186 SC STD MICRODIL/AGAR DIL: CPT | Performed by: PHYSICIAN ASSISTANT

## 2022-05-01 NOTE — PROGRESS NOTES
Assessment & Plan     1. Dysuria  UA is completely clear today, will culture urine to ensure  Suspect that flank pain is M/S in nature, OK to take Tylenol for pain  Discussed return precautions.   - UA with Microscopic reflex to Culture; Future  - UA with Microscopic reflex to Culture  - Urine Microscopic        Return in about 2 days (around 5/3/2022), or if symptoms worsen or fail to improve.    Diagnosis and treatment plan was reviewed with patient and/or family.   We went over any labs or imaging. Discussed worsening symptoms or little to no relief despite treatment plan to follow-up with PCP or return to clinic.  Patient verbalizes understanding. All questions were addressed and answered.     Kaylie Adams, ROSE MARY  [facility] CARE [place]    CHIEF COMPLAINT:   Chief Complaint   Patient presents with     Urgent Care     Dysuria     Pt in clinic to have eval fof dysuria, back pain, and abdominal pain.     Bakari Boateng is a 51 year old female who presents to clinic today for evaluation of dysuria and urinary frequency. Yesterday, she had some left sided flank pain, but that feels better today. Denies having fever, chills, nausea, vomiting, hematuria or weakness.  Vaginal discharge, itching or pain are not present.     Past Medical History:   Diagnosis Date     Bone disorder     Low bone mass for age     Cyst of left ovary 08/21/2015     Menarche 15 years old     Past Surgical History:   Procedure Laterality Date     COLONOSCOPY N/A 2/18/2021    Procedure: COLONOSCOPY;  Surgeon: Ken Segura MD;  [facility], C[facility]  1997     Social History     Tobacco Use     Smoking status: Never Smoker     Smokeless tobacco: Never Used   Substance Use Topics     Alcohol use: Yes     Comment: occ     Current Outpatient Medications   Medication     acetaminophen (TYLENOL) 325 MG tablet     cetirizine (ZYRTEC) 10 MG tablet     desogestrel-ethinyl estradiol (APRI) 0.15-30  "MG-MCG tablet     fluticasone (FLONASE) 50 MCG/ACT spray     loratadine (CLARITIN) 10 MG tablet     pseudoePHEDrine (SUDAFED) 30 MG tablet     No current facility-administered medications for this visit.     Allergies   Allergen Reactions     Dust Mites      Nkda [No Known Drug Allergies]        10 point ROS of systems were all negative except for pertinent positives noted in my HPI.      Exam:   /83   Pulse 89   Temp 99  F (37.2  C) (Temporal)   Ht 1.626 m (5' 4\")   Wt 49.4 kg (109 lb)   LMP 03/12/2022 (Exact Date)   SpO2 97%   BMI 18.71 kg/m    Constitutional: healthy, alert and no distress  ENT: MMM  Cardiovascular: RRR  Respiratory: CTA bilaterally, no rhonchi or rales  Gastrointestinal: soft and nontender  Back: No CVA tenderness B/L  Skin: no rashes      Results for orders placed or performed in visit on 05/01/22   UA with Microscopic reflex to Culture     Status: Normal    Specimen: Urine, Clean Catch   Result Value Ref Range    Color Urine Yellow Colorless, Straw, Light Yellow, Yellow    Appearance Urine Clear Clear    Glucose Urine Negative Negative mg/dL    Bilirubin Urine Negative Negative    Ketones Urine Negative Negative mg/dL    Specific Gravity Urine 1.010 1.003 - 1.035    Blood Urine Negative Negative    pH Urine 7.0 5.0 - 7.0    Protein Albumin Urine Negative Negative mg/dL    Urobilinogen Urine 0.2 0.2, 1.0 E.U./dL    Nitrite Urine Negative Negative    Leukocyte Esterase Urine Negative Negative   Urine Microscopic     Status: Normal   Result Value Ref Range    RBC Urine None Seen 0-2 /HPF /HPF    WBC Urine 0-5 0-5 /HPF /HPF    Narrative    Urine Culture not indicated           "

## 2022-05-01 NOTE — PATIENT INSTRUCTIONS
Urine looks clear today. Your urine culture is pending.   If you have fever, chills, vomiting or severe flank or abdominal pain please follow-up right away.

## 2022-05-03 DIAGNOSIS — N30.00 ACUTE CYSTITIS WITHOUT HEMATURIA: Primary | ICD-10-CM

## 2022-05-03 LAB — BACTERIA UR CULT: ABNORMAL

## 2022-05-03 RX ORDER — SULFAMETHOXAZOLE/TRIMETHOPRIM 800-160 MG
1 TABLET ORAL 2 TIMES DAILY
Qty: 10 TABLET | Refills: 0 | Status: SHIPPED | OUTPATIENT
Start: 2022-05-03 | End: 2022-05-04

## 2022-05-04 ENCOUNTER — NURSE TRIAGE (OUTPATIENT)
Dept: NURSING | Facility: CLINIC | Age: 52
End: 2022-05-04
Payer: COMMERCIAL

## 2022-05-04 DIAGNOSIS — N30.00 ACUTE CYSTITIS WITHOUT HEMATURIA: ICD-10-CM

## 2022-05-04 RX ORDER — SULFAMETHOXAZOLE/TRIMETHOPRIM 800-160 MG
1 TABLET ORAL 2 TIMES DAILY
Qty: 10 TABLET | Refills: 0 | Status: SHIPPED | OUTPATIENT
Start: 2022-05-04 | End: 2023-12-28

## 2022-05-04 NOTE — TELEPHONE ENCOUNTER
Seen for UTI on 5/1/22  Upper left back pain, weak and chills  No fever. No blood in the urine.   Patient has COVID  She has an infected tooth.   She has not started the antibiotic yet.   Changed pharmacy to a drive up pharmacy as requested. Advised patient to go to pharmacy this am and start her antibiotics. Advised that is symptoms worsen or fail to improve she should call back  Per RN protocol patient should be seen today.   Home care suggestions reviewed with caller per RN protocol.   Tabatha Dunn RN on 5/4/2022 at 8:21 AM          Reason for Disposition    Side (flank) or lower back pain present    Additional Information    Negative: Fever > 100.4 F (38.0 C)    Negative: Decreased urination and drinking very little and dehydration suspected (e.g., dark urine, no urine > 12 hours, very dry mouth, very lightheaded)    Negative: Patient sounds very sick or weak to the triager    Negative: Unable to urinate (or only a few drops) > 4 hours and bladder feels very full (e.g., palpable bladder or strong urge to urinate)    Negative: Followed a genital area injury    Negative: Followed a genital area injury (penis, scrotum)    Negative: Vaginal discharge    Negative: Pus (white, yellow) or bloody discharge from end of penis    Negative: Discomfort (pain, burning or stinging) when passing urine and pregnant    Negative: Discomfort (pain, burning or stinging) when passing urine and female    Negative: Discomfort (pain, burning or stinging) when passing urine and male    Negative: Pain or itching in the vulvar area    Negative: Pain in scrotum is main symptom    Negative: Blood in the urine is main symptom    Negative: Symptoms arising from use of a urinary catheter (Figueroa or Coude)    Negative: Shock suspected (e.g., cold/pale/clammy skin, too weak to stand, low BP, rapid pulse)    Negative: Sounds like a life-threatening emergency to the triager    Protocols used: URINARY SYMPTOMS-A-[place]

## 2022-05-04 NOTE — TELEPHONE ENCOUNTER
Pt's  calling to confirm if Rx for antibiotic was sent to the [facility] on [address]. Says pharmacy does not have it.  Called [facility] on [address] and they have the Rx ready. Pharmacy tech will call the pt to inform her.    Lindsay Ross, RN, BSN  [facility]   Triage Nurse Advisor    Additional Information    Negative: Drug overdose and triager unable to answer question    Negative: Caller requesting information unrelated to medicine    Negative: Caller requesting a prescription for Strep throat and has a positive culture result    Negative: Rash while taking a medication or within 3 days of stopping it    Negative: Immunization reaction suspected    Negative: Asthma and having symptoms of asthma (cough, wheezing, etc.)    Negative: Breastfeeding questions about mother's medicines and diet    Negative: MORE THAN A DOUBLE DOSE of a prescription or over-the-counter (OTC) drug    Negative: DOUBLE DOSE (an extra dose or lesser amount) of over-the-counter (OTC) drug and any symptoms (e.g., dizziness, nausea, pain, sleepiness)    Negative: DOUBLE DOSE (an extra dose or lesser amount) of prescription drug and any symptoms (e.g., dizziness, nausea, pain, sleepiness)    Negative: Took another person's prescription drug    Negative: DOUBLE DOSE (an extra dose or lesser amount) of prescription drug and NO symptoms (Exception: a double dose of antibiotics)    Negative: Diabetes drug error or overdose (e.g., took wrong type of insulin or took extra dose)    Caller has medication question about med not prescribed by PCP and triager unable to answer question (e.g., compatibility with other med, storage)    Protocols used: MEDICATION QUESTION CALL-A-[place]

## 2022-10-16 ENCOUNTER — HEALTH MAINTENANCE LETTER (OUTPATIENT)
Age: 52
End: 2022-10-16

## 2023-06-01 ENCOUNTER — HEALTH MAINTENANCE LETTER (OUTPATIENT)
Age: 53
End: 2023-06-01

## 2023-06-17 ENCOUNTER — HEALTH MAINTENANCE LETTER (OUTPATIENT)
Age: 53
End: 2023-06-17

## 2023-08-16 DIAGNOSIS — N93.9 ABNORMAL UTERINE BLEEDING (AUB): ICD-10-CM

## 2023-08-16 RX ORDER — DESOGESTREL AND ETHINYL ESTRADIOL 0.15-0.03
1 KIT ORAL DAILY
Qty: 90 TABLET | Refills: 0 | Status: SHIPPED | OUTPATIENT
Start: 2023-08-16 | End: 2023-12-28

## 2023-12-28 ENCOUNTER — VIRTUAL VISIT (OUTPATIENT)
Dept: OBGYN | Facility: CLINIC | Age: 53
End: 2023-12-28
Payer: COMMERCIAL

## 2023-12-28 DIAGNOSIS — N95.1 PERIMENOPAUSE: ICD-10-CM

## 2023-12-28 DIAGNOSIS — N93.9 ABNORMAL UTERINE BLEEDING (AUB): Primary | ICD-10-CM

## 2023-12-28 PROCEDURE — 99442 PR PHYSICIAN TELEPHONE EVALUATION 11-20 MIN: CPT | Performed by: OBSTETRICS & GYNECOLOGY

## 2023-12-28 RX ORDER — DESOGESTREL AND ETHINYL ESTRADIOL 0.15-0.03
1 KIT ORAL DAILY
Qty: 90 TABLET | Refills: 4 | Status: SHIPPED | OUTPATIENT
Start: 2023-12-28 | End: 2024-05-24

## 2023-12-28 NOTE — PROGRESS NOTES
Tasneem is a 53 year old who is being evaluated via a billable telephone visit.      How would you like to obtain your AVS? Cece    Distant Location (provider location):  On-site    Patient Location: work    Subjective   Tasneem is a 53 year old, presenting for the following health issues: She was started on OCPs in 2022, but did not end up starting them until about 5 months ago, needs a refill.    She was started due to DUB and bothersome perimenopausal symptoms and then had insurance issues that kept her from started.  She continued to have menses until she started recently and does have withdrawal bleeding each month.  Her perimenopausal symptoms are much improved and she feels very good on the pills.    She has not had an in person visit since 4/22, but did read that OCPs can increase BP so she got a home BP cuff and reports all her BPs at home are normal, <130/80.    Objective      Vitals:  No vitals were obtained today due to virtual visit.    Physical Exam   healthy, alert, and no distress  PSYCH: Alert and oriented times 3; coherent speech, normal   rate and volume, able to articulate logical thoughts, able   to abstract reason, no tangential thoughts, no hallucinations   or delusions  Her affect is normal  RESP: No cough, no audible wheezing, able to talk in full sentences  Remainder of exam unable to be completed due to telephone visits    A/P; DUB secondary to perimenopause   Refill of OCPs faxed.  We discussed that she will be transitioning in to menopause likely soon, so transition to HRTs would be optimal at that point.  We discussed difficulty knowing when that may be unless menses stop on OCP.  Since she was having spontaneous menses as recent as 5 months ago, could consider trial off OCPs in the next 1-2 years if symptoms allow and transition at that time if no menses.   We also discussed she is due for routine preventative care such as annual exam, lab work and mammogram.  Instructed her to schedule  either with Dr. Burk or PCP per her preference. Can schedule mammogram anytime. Questions answered    RODERICK JO MD    Phone call duration: 13 minutes

## 2024-03-05 ENCOUNTER — PATIENT OUTREACH (OUTPATIENT)
Dept: CARE COORDINATION | Facility: CLINIC | Age: 54
End: 2024-03-05
Payer: COMMERCIAL

## 2024-03-10 ENCOUNTER — MYC REFILL (OUTPATIENT)
Dept: OBGYN | Facility: CLINIC | Age: 54
End: 2024-03-10
Payer: COMMERCIAL

## 2024-03-10 DIAGNOSIS — N93.9 ABNORMAL UTERINE BLEEDING (AUB): ICD-10-CM

## 2024-03-11 RX ORDER — DESOGESTREL AND ETHINYL ESTRADIOL 0.15-0.03
1 KIT ORAL DAILY
Qty: 90 TABLET | Refills: 4 | OUTPATIENT
Start: 2024-03-11

## 2024-05-22 ENCOUNTER — MYC REFILL (OUTPATIENT)
Dept: OBGYN | Facility: CLINIC | Age: 54
End: 2024-05-22
Payer: COMMERCIAL

## 2024-05-22 DIAGNOSIS — N93.9 ABNORMAL UTERINE BLEEDING (AUB): ICD-10-CM

## 2024-05-22 RX ORDER — DESOGESTREL AND ETHINYL ESTRADIOL 0.15-0.03
1 KIT ORAL DAILY
Qty: 90 TABLET | Refills: 4 | OUTPATIENT
Start: 2024-05-22

## 2024-05-24 ENCOUNTER — MYC MEDICAL ADVICE (OUTPATIENT)
Dept: OBGYN | Facility: CLINIC | Age: 54
End: 2024-05-24
Payer: COMMERCIAL

## 2024-05-24 DIAGNOSIS — N93.9 ABNORMAL UTERINE BLEEDING (AUB): ICD-10-CM

## 2024-05-24 RX ORDER — DESOGESTREL AND ETHINYL ESTRADIOL 0.15-0.03
1 KIT ORAL DAILY
Qty: 90 TABLET | Refills: 7 | Status: SHIPPED | OUTPATIENT
Start: 2024-05-24 | End: 2024-09-19

## 2024-05-24 NOTE — TELEPHONE ENCOUNTER
Pt taking apri daily and taking continuously to avoid periods and anemia  Seems like unable to refill OCP early d/t dx/sig  Pended med with changed sig--unsure if anything else to adjust?  Pt dx code already looks like AUB not contraception    Routing to provider to advise.  Alejandra Gonzalez, RN on 5/24/2024 at 1:39 PM

## 2024-06-15 ENCOUNTER — OFFICE VISIT (OUTPATIENT)
Dept: FAMILY MEDICINE | Facility: CLINIC | Age: 54
End: 2024-06-15
Payer: COMMERCIAL

## 2024-06-15 VITALS
HEART RATE: 82 BPM | TEMPERATURE: 98.3 F | DIASTOLIC BLOOD PRESSURE: 72 MMHG | SYSTOLIC BLOOD PRESSURE: 118 MMHG | OXYGEN SATURATION: 98 %

## 2024-06-15 DIAGNOSIS — J01.90 ACUTE SINUSITIS, RECURRENCE NOT SPECIFIED, UNSPECIFIED LOCATION: Primary | ICD-10-CM

## 2024-06-15 PROCEDURE — 99213 OFFICE O/P EST LOW 20 MIN: CPT

## 2024-06-15 NOTE — PROGRESS NOTES
Assessment & Plan     Acute sinusitis, recurrence not specified, unspecified location    - Based on assessment of on going symptoms with continuous facial pain with pressure possible sinus infection, will treat with amoxicillin for 7 days.    - amoxicillin-clavulanate (AUGMENTIN) 875-125 MG tablet; Take 1 tablet by mouth 2 times daily for 7 days.          Return in about 1 week (around 2024), or if symptoms worsen or fail to improve, for Follow up.    Subjective   Tasneem is a 53 year old, presenting for the following health issues:  Urgent Care and Sinus Problem (Sinus ongoing for 3 months. Tx- Allegra, mucinex, flonase. Sx- teeth pain, HA, ear pain. When medication wears off, she starts to feel the pain again)    ALETA Boateng is a 53 year old female who present to the clinic with concern for  ongoing purulent nasal discharges, facial pressure and pain off and on for the past 3 months ( sinus infection). Stated that she's currently having symptoms of facial pain and pressure behind her eyes along with post  congestions at the back of her throat. Has been taking Mucinex and sudafed but has not improve symptoms. Denies fever, diarrhea or vomiting.      Review of Systems  Constitutional, HEENT, cardiovascular, pulmonary, gi and gu systems are negative, except as otherwise noted.      Objective    /72   Pulse 82   Temp 98.3  F (36.8  C) (Tympanic)   LMP 2024   SpO2 98%   There is no height or weight on file to calculate BMI.    Physical Exam   GENERAL: alert and no distress  HENT: normal cephalic/atraumatic, ear canals and TM's normal, nose and mouth without ulcers or lesions, oropharynx clear, oral mucous membranes moist, and sinuses: ethmoid tenderness on bilateral with facial pain and pressure.  NECK: no adenopathy, no asymmetry, masses, or scars  RESP: lungs clear to auscultation - no rales, rhonchi or wheezes  CV: regular rate and rhythm, normal S1 S2, no S3 or S4, no murmur, click or  rub, no peripheral edema          Signed Electronically by: [facility]

## 2024-06-18 ENCOUNTER — MYC REFILL (OUTPATIENT)
Dept: OBGYN | Facility: CLINIC | Age: 54
End: 2024-06-18
Payer: COMMERCIAL

## 2024-06-18 DIAGNOSIS — N93.9 ABNORMAL UTERINE BLEEDING (AUB): ICD-10-CM

## 2024-06-18 RX ORDER — DESOGESTREL AND ETHINYL ESTRADIOL 0.15-0.03
1 KIT ORAL DAILY
Qty: 90 TABLET | Refills: 7 | OUTPATIENT
Start: 2024-06-18

## 2024-06-25 ENCOUNTER — OFFICE VISIT (OUTPATIENT)
Dept: OPTOMETRY | Facility: CLINIC | Age: 54
End: 2024-06-25
Payer: COMMERCIAL

## 2024-06-25 DIAGNOSIS — H10.13 ALLERGIC CONJUNCTIVITIS OF BOTH EYES: ICD-10-CM

## 2024-06-25 DIAGNOSIS — H52.13 MYOPIA OF BOTH EYES: Primary | ICD-10-CM

## 2024-06-25 DIAGNOSIS — H52.4 PRESBYOPIA: ICD-10-CM

## 2024-06-25 DIAGNOSIS — H04.129 DRY EYE: ICD-10-CM

## 2024-06-25 PROCEDURE — 92015 DETERMINE REFRACTIVE STATE: CPT | Performed by: OPTOMETRIST

## 2024-06-25 PROCEDURE — 92004 COMPRE OPH EXAM NEW PT 1/>: CPT | Performed by: OPTOMETRIST

## 2024-06-25 ASSESSMENT — REFRACTION_MANIFEST
OD_SPHERE: -0.50
OS_CYLINDER: SPHERE
OS_ADD: +2.50
OD_CYLINDER: SPHERE
OS_CYLINDER: SPHERE
OD_SPHERE: -0.50
OD_ADD: +2.50
METHOD_AUTOREFRACTION: 1
OD_CYLINDER: SPHERE
OS_SPHERE: -0.50
OS_SPHERE: -0.50

## 2024-06-25 ASSESSMENT — KERATOMETRY
OS_K1POWER_DIOPTERS: 45.75
OS_K2POWER_DIOPTERS: 47
OD_K2POWER_DIOPTERS: 47.25
OS_AXISANGLE_DEGREES: 63
OS_AXISANGLE2_DEGREES: 153
OD_K1POWER_DIOPTERS: 46.12
OD_AXISANGLE_DEGREES: 76
OD_AXISANGLE2_DEGREES: 166

## 2024-06-25 ASSESSMENT — CONF VISUAL FIELD
OD_SUPERIOR_TEMPORAL_RESTRICTION: 0
OS_SUPERIOR_TEMPORAL_RESTRICTION: 0
OD_INFERIOR_TEMPORAL_RESTRICTION: 0
OS_INFERIOR_TEMPORAL_RESTRICTION: 0
METHOD: COUNTING FINGERS
OD_NORMAL: 1
OD_SUPERIOR_NASAL_RESTRICTION: 0
OS_SUPERIOR_NASAL_RESTRICTION: 0
OD_INFERIOR_NASAL_RESTRICTION: 0
OS_INFERIOR_NASAL_RESTRICTION: 0
OS_NORMAL: 1

## 2024-06-25 ASSESSMENT — VISUAL ACUITY
OS_SC: 20/20
OS_SC: 20/60-1
OD_SC: 20/80
OD_SC: 20/20
METHOD: SNELLEN - LINEAR
OD_SC+: -1

## 2024-06-25 ASSESSMENT — TONOMETRY
IOP_METHOD: APPLANATION
OS_IOP_MMHG: 18
OD_IOP_MMHG: 18

## 2024-06-25 ASSESSMENT — EXTERNAL EXAM - LEFT EYE: OS_EXAM: NORMAL

## 2024-06-25 ASSESSMENT — EXTERNAL EXAM - RIGHT EYE: OD_EXAM: NORMAL

## 2024-06-25 ASSESSMENT — SLIT LAMP EXAM - LIDS
COMMENTS: MEIBOMIAN GLAND DYSFUNCTION
COMMENTS: MEIBOMIAN GLAND DYSFUNCTION

## 2024-06-25 ASSESSMENT — CUP TO DISC RATIO
OS_RATIO: 0.45
OD_RATIO: 0.45

## 2024-06-25 NOTE — PROGRESS NOTES
Chief Complaint   Patient presents with    Annual Eye Exam        Last Eye Exam: 1 year ago   Dilated Previously: Yes, side effects of dilation explained today    What are you currently using to see?  glasses and readers - computer glasses        Distance Vision Acuity: Noticed gradual change in both eyes unaided     Near Vision Acuity: Not satisfied in glasses     Eye Comfort: dry and watery  Do you use eye drops? : Yes: otc artificial tears when needed but not often        Nataliya Paul - Optometric Assistant           Medical, surgical and family histories reviewed and updated 6/25/2024.     allergies  OBJECTIVE: See Ophthalmology exam    ASSESSMENT:    ICD-10-CM    1. Myopia of both eyes  H52.13       2. Presbyopia  H52.4       3. Allergic conjunctivitis of both eyes  H10.13       4. Dry eye  H04.129           PLAN:   Artificial tears  / lid cleansing to remove allergens  Discussed progressive addition lens     Mel Lobato OD

## 2024-06-25 NOTE — PATIENT INSTRUCTIONS
DRY EYE TREATMENT    I recommend using artificial tears for your dry eye. There are over the counter drops that work well and may be used up to 4x daily. ( systane balance, complete or ultra, blink, refresh optive, soothe xp, theratears) stay away from any red eye relief products such as visine and clear eyes. Lumify can help with redness used once daily.     If you need more than 4 drops daily, use a preservative free product which come in individual vials and may be used for up to 24 hours and then discarded.   The more severe the dry eye, the more frequent instillation of artificial tears that are needed to reduce irritation/ inflammation. (Sometimes every 1-2 hours for a couple of days).  You can also add a lubricating ointment in the lower lid at bedtime. ( over the counter refresh pm, genteal gel, lacrilube etc.)    Artificial tears work best as a preventative and not as well after your eyes are starting to bother you and/ or are red.  It may take 4- 6 weeks of using the drops before you notice improvement.  If after that time you are still having problems schedule an appointment for an evaluation to discuss different treatments.    Dry eyes are a chronic condition and you may have more symptoms at certain times of the year.      Additional recommended treatment:  Warm compresses once to twice daily for 5-10 minutes  Directions for warm soaks  There are few methods for hot compresses. Moisten a washcloth with hot water, or microwave for 10 seconds, being careful to not get the cloth too hot.   Then put the washcloth onto your eyelids for 5 minutes. It will cool quickly so a rice pack or eyemask that can be heated and laid on top of the washcloth will help retain the heat.   Lid cleansing    Overabundance of bacterial microorganisms along the eyelashes and lid margins induce stress on the tear film and promote inflammation.  Regular lid hygiene helps diminish the bacterial population to prevent inflammation  and infection.  Use a warm compress to loosen any crusts   Cleanse lids once daily with a lid cleansing product as directed such as Ocusoft or Sterilid which can be purchased at most pharmacies. Diluted baby shampoo will also work, but not as well as it dries the skin and can irritate eyelids.  Hypo chlor spray may also be used on closed lids.    Blink regularly  Stay hydrated  Increase humidity  Wear sunglasses   No smoking/ vaping   Replace cosmetics every few months and remove daily  Avoid direct exposure to forced air--turn air vents in the car away and keep fans from blowing directly on your face     Using over the counter Zaditor or Alaway- 1 drop in both eyes 2 x day or Pataday once daily   along with cold compresses and frequent eye/ brow washing will help for itchy, watery eyes.   Using continuously for 2 weeks will have better efficacy    You may also use chilled artificial tears during the day two times daily

## 2024-06-25 NOTE — LETTER
6/25/2024      Tasneem Mccollum  [address]  [facility] [address]      Dear Colleague,    Thank you for referring your patient, Tas[facility]ccollum[facility]. Please see a copy of my visit note below.    Chief Complaint   Patient presents with     Annual Eye Exam        Last Eye Exam: 1 year ago   Dilated Previously: Yes, side effects of dilation explained today    What are you currently using to see?  glasses and readers - computer glasses        Distance Vision Acuity: Noticed gradual change in both eyes unaided     Near Vision Acuity: Not satisfied in glasses     Eye Comfort: dry and watery  Do you use eye drops? : Yes: otc artificial tears when needed but not often        Nataliya Mcor - Optometric Assistant           Medical, surgical and family histories reviewed and updated 6/25/2024.     allergies  OBJECTIVE: See Ophthalmology exam    ASSESSMENT:    ICD-10-CM    1. Myopia of both eyes  H52.13       2. Presbyopia  H52.4       3. Allergic conjunctivitis of both eyes  H10.13       4. Dry eye  H04.129           PLAN:   Artificial tears  / lid cleansing to remove allergens  Discussed progressive addition lens     Mel Lobato OD     Again, thank you for allowing me to participate in the care of your patient.        Sincerely,        Mel Lobato, OD

## 2024-06-28 ENCOUNTER — TELEPHONE (OUTPATIENT)
Dept: OBGYN | Facility: CLINIC | Age: 54
End: 2024-06-28
Payer: COMMERCIAL

## 2024-06-30 SDOH — HEALTH STABILITY: PHYSICAL HEALTH: ON AVERAGE, HOW MANY DAYS PER WEEK DO YOU ENGAGE IN MODERATE TO STRENUOUS EXERCISE (LIKE A BRISK WALK)?: 3 DAYS

## 2024-06-30 SDOH — HEALTH STABILITY: PHYSICAL HEALTH: ON AVERAGE, HOW MANY MINUTES DO YOU ENGAGE IN EXERCISE AT THIS LEVEL?: 30 MIN

## 2024-06-30 ASSESSMENT — SOCIAL DETERMINANTS OF HEALTH (SDOH): HOW OFTEN DO YOU GET TOGETHER WITH FRIENDS OR RELATIVES?: ONCE A WEEK

## 2024-07-02 ENCOUNTER — OFFICE VISIT (OUTPATIENT)
Dept: FAMILY MEDICINE | Facility: CLINIC | Age: 54
End: 2024-07-02
Attending: FAMILY MEDICINE
Payer: COMMERCIAL

## 2024-07-02 ENCOUNTER — LAB (OUTPATIENT)
Dept: LAB | Facility: CLINIC | Age: 54
End: 2024-07-02
Attending: FAMILY MEDICINE
Payer: COMMERCIAL

## 2024-07-02 VITALS
SYSTOLIC BLOOD PRESSURE: 136 MMHG | BODY MASS INDEX: 20.14 KG/M2 | WEIGHT: 118 LBS | HEART RATE: 106 BPM | HEIGHT: 64 IN | DIASTOLIC BLOOD PRESSURE: 85 MMHG

## 2024-07-02 DIAGNOSIS — Z13.1 SCREENING FOR DIABETES MELLITUS: ICD-10-CM

## 2024-07-02 DIAGNOSIS — Z12.31 ENCOUNTER FOR SCREENING MAMMOGRAM FOR MALIGNANT NEOPLASM OF BREAST: ICD-10-CM

## 2024-07-02 DIAGNOSIS — Z13.220 SCREENING FOR CHOLESTEROL LEVEL: ICD-10-CM

## 2024-07-02 DIAGNOSIS — R03.0 ELEVATED BP WITHOUT DIAGNOSIS OF HYPERTENSION: ICD-10-CM

## 2024-07-02 DIAGNOSIS — Z86.39 HISTORY OF IRON DEFICIENCY: ICD-10-CM

## 2024-07-02 DIAGNOSIS — Z00.00 ROUTINE GENERAL MEDICAL EXAMINATION AT A HEALTH CARE FACILITY: Primary | ICD-10-CM

## 2024-07-02 DIAGNOSIS — Z12.11 SCREENING FOR COLON CANCER: ICD-10-CM

## 2024-07-02 DIAGNOSIS — R19.5 CHANGE IN STOOL: ICD-10-CM

## 2024-07-02 DIAGNOSIS — M85.9 LOW BONE DENSITY FOR AGE: ICD-10-CM

## 2024-07-02 DIAGNOSIS — D22.9 NUMEROUS SKIN MOLES: ICD-10-CM

## 2024-07-02 DIAGNOSIS — R63.0 DECREASED APPETITE: ICD-10-CM

## 2024-07-02 LAB
ANION GAP SERPL CALCULATED.3IONS-SCNC: 12 MMOL/L (ref 7–15)
BUN SERPL-MCNC: 11.8 MG/DL (ref 6–20)
CALCIUM SERPL-MCNC: 9 MG/DL (ref 8.6–10)
CHLORIDE SERPL-SCNC: 102 MMOL/L (ref 98–107)
CHOLEST SERPL-MCNC: 262 MG/DL
CREAT SERPL-MCNC: 0.72 MG/DL (ref 0.51–0.95)
DEPRECATED HCO3 PLAS-SCNC: 26 MMOL/L (ref 22–29)
EGFRCR SERPLBLD CKD-EPI 2021: >90 ML/MIN/1.73M2
ERYTHROCYTE [DISTWIDTH] IN BLOOD BY AUTOMATED COUNT: 12.4 % (ref 10–15)
FASTING STATUS PATIENT QL REPORTED: NO
FASTING STATUS PATIENT QL REPORTED: NO
FERRITIN SERPL-MCNC: 14 NG/ML (ref 11–328)
GLUCOSE SERPL-MCNC: 92 MG/DL (ref 70–99)
HBA1C MFR BLD: 5.3 %
HCT VFR BLD AUTO: 44.2 % (ref 35–47)
HDLC SERPL-MCNC: 113 MG/DL
HGB BLD-MCNC: 14.4 G/DL (ref 11.7–15.7)
LDLC SERPL CALC-MCNC: 136 MG/DL
MCH RBC QN AUTO: 29.5 PG (ref 26.5–33)
MCHC RBC AUTO-ENTMCNC: 32.6 G/DL (ref 31.5–36.5)
MCV RBC AUTO: 91 FL (ref 78–100)
NONHDLC SERPL-MCNC: 149 MG/DL
PLATELET # BLD AUTO: 394 10E3/UL (ref 150–450)
POTASSIUM SERPL-SCNC: 4 MMOL/L (ref 3.4–5.3)
RBC # BLD AUTO: 4.88 10E6/UL (ref 3.8–5.2)
SODIUM SERPL-SCNC: 140 MMOL/L (ref 135–145)
TRIGL SERPL-MCNC: 64 MG/DL
WBC # BLD AUTO: 4.8 10E3/UL (ref 4–11)

## 2024-07-02 PROCEDURE — 82565 ASSAY OF CREATININE: CPT

## 2024-07-02 PROCEDURE — 83718 ASSAY OF LIPOPROTEIN: CPT

## 2024-07-02 PROCEDURE — 99213 OFFICE O/P EST LOW 20 MIN: CPT | Performed by: FAMILY MEDICINE

## 2024-07-02 PROCEDURE — 82728 ASSAY OF FERRITIN: CPT

## 2024-07-02 PROCEDURE — 36415 COLL VENOUS BLD VENIPUNCTURE: CPT

## 2024-07-02 PROCEDURE — 99396 PREV VISIT EST AGE 40-64: CPT | Performed by: FAMILY MEDICINE

## 2024-07-02 PROCEDURE — 82306 VITAMIN D 25 HYDROXY: CPT

## 2024-07-02 PROCEDURE — 83036 HEMOGLOBIN GLYCOSYLATED A1C: CPT

## 2024-07-02 PROCEDURE — 85027 COMPLETE CBC AUTOMATED: CPT

## 2024-07-02 PROCEDURE — 82374 ASSAY BLOOD CARBON DIOXIDE: CPT

## 2024-07-02 RX ORDER — FERROUS SULFATE 325(65) MG
325 TABLET ORAL
COMMUNITY

## 2024-07-02 NOTE — PATIENT INSTRUCTIONS
"Thank you for trusting us with your care!     If you need to contact us for questions about:  Symptoms, Scheduling & Medical Questions; Non-urgent (2-3 day response) Lico message, Urgent (needing response today) [phone] (if after 3:30pm next day response)   Prescriptions: Please call your Pharmacy   Billing: [facility] [phone] or [facility]:[phone]    Patient Education   Preventive Care Advice   This is general advice we often give to help people stay healthy. Your care team may have specific advice just for you. Please talk to your care team about your own preventive care needs.  Lifestyle  Exercise at least 150 minutes each week (30 minutes a day, 5 days a week).  Do muscle strengthening activities 2 days a week. These help control your weight and prevent disease.  No smoking.  Wear sunscreen to prevent skin cancer.  Have your home tested for radon every 2 to 5 years. Radon is a colorless, odorless gas that can harm your lungs. To learn more, go to www.health.[facility].mn. and search for \"Radon in Homes.\"  Keep guns unloaded and locked up in a safe place like a safe or gun vault, or, use a gun lock and hide the keys. Always lock away bullets separately. To learn more, visit [facility].mn.gov and search for \"safe gun storage.\"  Nutrition  Eat 5 or more servings of fruits and vegetables each day.  Try wheat bread, brown rice and whole grain pasta (instead of white bread, rice, and pasta).  Get enough calcium and vitamin D. Check the label on foods and aim for 100% of the RDA (recommended daily allowance).  Regular exams  Have a dental exam and cleaning every 6 months.  See your health care team every year to talk about:  Any changes in your health.  Any medicines your care team has prescribed.  Preventive care, family planning, and ways to prevent chronic diseases.  Shots (vaccines)   HPV shots (up to age 26), if you've never had them before.  Hepatitis B shots (up to age 59), if you've never had them " before.  COVID-19 shot: Get this shot when it's due.  Flu shot: Get a flu shot every year.  Tetanus shot: Get a tetanus shot every 10 years.  Pneumococcal, hepatitis A, and RSV shots: Ask your care team if you need these based on your risk.  Shingles shot (for age 50 and up).  General health tests  Diabetes screening:  Starting at age 35, Get screened for diabetes at least every 3 years.  If you are younger than age 35, ask your care team if you should be screened for diabetes.  Cholesterol test: At age 39, start having a cholesterol test every 5 years, or more often if advised.  Bone density scan (DEXA): At age 50, ask your care team if you should have this scan for osteoporosis (brittle bones).  Hepatitis C: Get tested at least once in your life.  Abdominal aortic aneurysm screening: Talk to your doctor about having this screening if you:  Have ever smoked; and  Are biologically male; and  Are between the ages of 65 and 75.  STIs (sexually transmitted infections)  Before age 24: Ask your care team if you should be screened for STIs.  After age 24: Get screened for STIs if you're at risk. You are at risk for STIs (including HIV) if:  You are sexually active with more than one person.  You don't use condoms every time.  You or a partner was diagnosed with a sexually transmitted infection.  If you are at risk for HIV, ask about PrEP medicine to prevent HIV.  Get tested for HIV at least once in your life, whether you are at risk for HIV or not.  Cancer screening tests  Cervical cancer screening: If you have a cervix, begin getting regular cervical cancer screening tests at age 21. Most people who have regular screenings with normal results can stop after age 65. Talk about this with your provider.  Breast cancer scan (mammogram): If you've ever had breasts, begin having regular mammograms starting at age 40. This is a scan to check for breast cancer.  Colon cancer screening: It is important to start screening for  colon cancer at age 45.  Have a colonoscopy test every 10 years (or more often if you're at risk) Or, ask your provider about stool tests like a FIT test every year or Cologuard test every 3 years.  To learn more about your testing options, visit: www.[employer]/298546.pdf.  For help making a decision, visit: nick/ap17007.  Prostate cancer screening test: If you have a prostate and are age 55 to 69, ask your provider if you would benefit from a yearly prostate cancer screening test.  Lung cancer screening: If you are a current or former smoker age 50 to 80, ask your care team if ongoing lung cancer screenings are right for you.  For informational purposes only. Not to replace the advice of your health care provider. Copyright   2023 [employer]. All rights reserved. Clinically reviewed by the [facility] Transitions Program. Track the Bet [id number] - REV 04/24.

## 2024-07-02 NOTE — PROGRESS NOTES
Preventive Care Visit  [facility]  Nirali Farias MD, Family Medicine  Jul 2, 2024      Assessment & Plan     Routine general medical examination at a health care facility  Stopped OCP and will monitor for resumption of menses/menopausal symptoms.   Will schedule shingles vaccine on another date - [place]      History of iron deficiency  Not currently taking iron supplement consistently. Presumed to be due to DUB.  - CBC with Platelets; Future  - Ferritin; Future    Low bone density for age  History of low bone density requiring treatment with bisphosphonate for 1 year in her 30s. Check DEXA and vitamin D.   - DX Bone Density; Future  - 25 Hydroxyvitamin D2 and D3; Future    Screening for colon cancer  Will have colonoscopy with MAC and double prep.   - Colonoscopy Screening  Referral; Future    Screening for diabetes mellitus  Due for routine screening.   - Hemoglobin A1c; Future  - Basic Metabolic Panel; Future    Encounter for screening mammogram for malignant neoplasm of breast  Due for routine screening.   - MA Screening Bilateral w/ Parviz; Future    Numerous skin moles  Would like FBSE with dermatology.   - Adult Dermatology  Referral; Future    Elevated BP without diagnosis of hypertension  BP improved on recheck. Check BMP today.   - Basic Metabolic Panel; Future    Screening for cholesterol level  Due for routine screening.   - Lipid Panel; Future    Counseling  Appropriate preventive services were discussed with this patient, including applicable screening as appropriate for fall prevention, nutrition, physical activity, Tobacco-use cessation, weight loss and cognition.  Checklist reviewing preventive services available has been given to the patient.  Reviewed patient's diet, addressing concerns and/or questions.   She is at risk for lack of exercise and has been provided with information to increase  "physical activity for the benefit of her well-being.       Return in about 53 weeks (around 7/8/2025) for Annual Wellness Visit.    Bakari Boateng is a 54 year old, presenting for the following:  Annual Visit         HPI  - Stopped OCP 2 weeks ago, started due to dysfunctional uterine bleeding and perimenopausal symptoms (hot flashes). OCP helped with hot flashes. She was wondering if she has gone through menopause or not? Has had some \"micro hot flashes\" since stopping OCP.     - Would like a dermatology referral for a mole check.    - Last colonoscopy attempt in 2/2021 was an incomplete exam due to excessive discomfort, recommended repeat with MAC and double prep.     - Last mammogram was in 2022 and very uncomfortable for patient.      - History of low bone density in the past. She had a fracture while she was pregnant and also still breastfeeding. She had a DEXA at age 35 which showed low bone density for her age. She was treated with Fosamax for at least a year.     - History of iron deficiency anemia - possibly related to heavy menstrual cycles. She is not taking an iron supplement regularly.     - Elevated BP today but improved on recheck. History of normal CTA angiogram of 0 in 8/16/2021. Exercise stress echo 1/28/2020 with no inducible ischemia. She will randomly still feel a left sided chest discomfort when she lays down, but not with activity. It will last for seconds. This was similar symptoms that she went in to get checked back in 2020. She has been told in the past that she might have costochondritis.         6/30/2024   General Health   How would you rate your overall physical health? Good   Feel stress (tense, anxious, or unable to sleep) Not at all            6/30/2024   Nutrition   Three or more servings of calcium each day? Yes   Diet: Regular (no restrictions)   How many servings of fruit and vegetables per day? (!) 2-3   How many sweetened beverages each day? 0-1            6/30/2024 "   Exercise   Days per week of moderate/strenous exercise 3 days   Average minutes spent exercising at this level 30 min            6/30/2024   Social Factors   Frequency of gathering with friends or relatives Once a week   Worry food won't last until get money to buy more No   Food not last or not have enough money for food? No   Do you have housing? (Housing is defined as stable permanent housing and does not include staying ouside in a car, in a tent, in an abandoned building, in an overnight shelter, or couch-surfing.) Yes   Are you worried about losing your housing? No   Lack of transportation? No   Unable to get utilities (heat,electricity)? No            6/30/2024   Fall Risk   Fallen 2 or more times in the past year? No   Trouble with walking or balance? No             6/30/2024   Dental   Dentist two times every year? Yes            6/30/2024   TB Screening   Were you born outside of the [place]? No        Today's PHQ-2 Score:       7/1/2024    10:18 PM   PHQ-2 ( 1999 [employer])   Q1: Little interest or pleasure in doing things 0   Q2: Feeling down, depressed or hopeless 0   PHQ-2 Score 0   Q1: Little interest or pleasure in doing things Not at all   Q2: Feeling down, depressed or hopeless Not at all   PHQ-2 Score 0           6/30/2024   Substance Use   Alcohol more than 3/day or more than 7/wk No   Do you use any other substances recreationally? No        Social History     Tobacco Use    Smoking status: Never     Passive exposure: Never    Smokeless tobacco: Never   Vaping Use    Vaping status: Never Used   Substance Use Topics    Alcohol use: Yes     Comment: socially    Drug use: No           6/30/2024   Breast Cancer Screening   Family history of breast, colon, or ovarian cancer? Yes          6/30/2024   LAST FHS-7 RESULTS   1st degree relative breast or ovarian cancer No   Any relative bilateral breast cancer Unknown   Any male have breast cancer No   Any ONE woman have BOTH breast AND ovarian cancer No   Any  "woman with breast cancer before 50yrs No   2 or more relatives with breast AND/OR ovarian cancer No   2 or more relatives with breast AND/OR bowel cancer No      Mammogram Screening - Mammogram every 1-2 years updated in Health Maintenance based on mutual decision making        6/30/2024   STI Screening   New sexual partner(s) since last STI/HIV test? No        History of abnormal Pap smear: YES - reflected in Problem List and Health Maintenance accordingly        Latest Ref Rng & Units 3/14/2022    12:02 PM 5/27/2014    12:00 AM   PAP / HPV   PAP  Negative for Intraepithelial Lesion or Malignancy (NILM)     PAP (Historical)   OTHER-NIL, See Result    HPV 16 DNA Negative Negative     HPV 18 DNA Negative Negative     Other HR HPV Negative Negative       ASCVD Risk   The 10-year ASCVD risk score (Charles MONK, et al., 2019) is: 1.3%    Values used to calculate the score:      Age: 54 years      Sex: Female      Is Non- : No      Diabetic: No      Tobacco smoker: No      Systolic Blood Pressure: 136 mmHg      Is BP treated: No      HDL Cholesterol: 95 mg/dL      Total Cholesterol: 218 mg/dL      Reviewed and updated as needed this visit by Provider      Problems  Med Hx  Surg Hx  Fam Hx               Objective    Exam  /85   Pulse 106   Ht 1.62 m (5' 3.78\")   Wt 53.5 kg (118 lb)   LMP 06/10/2024 (Approximate)   BMI 20.39 kg/m       Physical Exam  GENERAL: alert and no distress  EYES: Eyes grossly normal to inspection, PERRL and conjunctivae and sclerae normal  HENT: ear canals and TM's normal, nose and mouth without ulcers or lesions  NECK: no adenopathy, no asymmetry, masses, or scars  RESP: lungs clear to auscultation - no rales, rhonchi or wheezes  CV: regular rate and rhythm, normal S1 S2, no S3 or S4, no murmur, click or rub, no peripheral edema, chest wall is very tender to palpation L > R  ABDOMEN: soft, nontender, without hepatosplenomegaly or masses  MS: no gross " musculoskeletal defects noted, no edema  SKIN: no suspicious lesions or rashes  NEURO: Normal strength and tone, mentation intact and speech normal  PSYCH: mentation appears normal, affect normal/bright        Signed Electronically by: Niarli Farias MD

## 2024-07-06 LAB
DEPRECATED CALCIDIOL+CALCIFEROL SERPL-MC: 42 UG/L (ref 20–75)
VITAMIN D2 SERPL-MCNC: 5 UG/L
VITAMIN D3 SERPL-MCNC: 37 UG/L

## 2024-08-10 ENCOUNTER — HEALTH MAINTENANCE LETTER (OUTPATIENT)
Age: 54
End: 2024-08-10

## 2024-09-06 ENCOUNTER — MYC MEDICAL ADVICE (OUTPATIENT)
Dept: GASTROENTEROLOGY | Facility: CLINIC | Age: 54
End: 2024-09-06
Payer: COMMERCIAL

## 2024-09-06 ENCOUNTER — TELEPHONE (OUTPATIENT)
Dept: GASTROENTEROLOGY | Facility: CLINIC | Age: 54
End: 2024-09-06
Payer: COMMERCIAL

## 2024-09-06 DIAGNOSIS — Z12.11 ENCOUNTER FOR SCREENING COLONOSCOPY: Primary | ICD-10-CM

## 2024-09-06 RX ORDER — BISACODYL 5 MG/1
TABLET, DELAYED RELEASE ORAL
Qty: 4 TABLET | Refills: 0 | Status: SHIPPED | OUTPATIENT
Start: 2024-09-06

## 2024-09-06 NOTE — TELEPHONE ENCOUNTER
Attempted to contact patient in order to complete pre assessment questions.     No answer. Left message to return call to [phone] option 4    Callback communication sent via [employer].    Rosalee Styles RN  --------------------------------------------------------------------------------------------------------------------      Pre visit planning completed.      Procedure details:    Patient scheduled for Colonoscopy on 9/11/24.     Arrival time: 0800. Procedure time 0900    Facility location: [facility]; [address], [place] [address]. Check in location: 2nd Floor.    Sedation type: MAC-Hx incomplete colonoscopy d/t pain     Pre op exam needed? No.    Indication for procedure: screening      Chart review:     Electronic implanted devices? No    Recent diagnosis of diverticulitis within the last 6 weeks? No      Medication review:    Diabetic? No    Anticoagulants? No    Weight loss medication/injectable? No.    NSAIDS? No    Other medication HOLDING recommendations:  Ferrous sulfate (iron supplements): HOLD 7 days before procedure.      Prep for procedure:     Bowel prep recommendation: Extended Golytely. Bowel prep prescription sent to      [facility] - [place], [place] - [address] [place] [address]   Due to: poor prep/inadequate bowel prep in the past.  and provider request/ordered.    Prep instructions sent via [employer]          Rosalee Styles RN  Endoscopy Procedure Pre Assessment RN  [phone] option 4   Patient used to take 2 daily  She went down to 1 and is having flare ups of jung's esophagus    Should she go back to taking 2 a day?  Will need a new script sent to the pharamcy [facility]

## 2024-09-06 NOTE — TELEPHONE ENCOUNTER
"Endoscopy Scheduling Screen    Have you had a positive Covid test in the last 14 days?  No    What is your communication preference for Instructions and/or Bowel Prep?   MyChart    What insurance is in the chart?  Other:  [facility]     Ordering/Referring Provider: JOHNNA SAINI     (If ordering provider performs procedure, schedule with ordering provider unless otherwise instructed. )    BMI: Estimated body mass index is 20.39 kg/m  as calculated from the following:    Height as of 7/2/24: 1.62 m (5' 3.78\").    Weight as of 7/2/24: 53.5 kg (118 lb).     Sedation Ordered  MAC/deep sedation.   BMI<= 45 45 < BMI <= 48 48 < BMI < = 50  BMI > 50   No Restrictions No [facility]  No [facility]  [facility] with exceptions Hospital Only OR Only       Do you have a history of malignant hyperthermia?  No    (Females) Are you currently pregnant?   No     Have you been diagnosed or told you have pulmonary hypertension?   No    Do you have an LVAD?  No    Have you been told you have moderate to severe sleep apnea?  No    Have you been told you have COPD, asthma, or any other lung disease?  No    Do you have any heart conditions?  No     Have you ever had or are you waiting for an organ transplant?  No. Continue scheduling, no site restrictions.    Have you had a stroke or transient ischemic attack (TIA aka \"mini stroke\" in the last 6 months?   No    Have you been diagnosed with or been told you have cirrhosis of the liver?   No    Are you currently on dialysis?   No    Do you need assistance transferring?   No    BMI: Estimated body mass index is 20.39 kg/m  as calculated from the following:    Height as of 7/2/24: 1.62 m (5' 3.78\").    Weight as of 7/2/24: 53.5 kg (118 lb).     Is patients BMI > 40 and scheduling location [facility]?  No    Do you take an injectable medication for weight loss or diabetes (excluding insulin)?  No    Do you take the medication Naltrexone?  No    Do you take blood thinners?  No       Prep   Are you currently on " dialysis or do you have chronic kidney disease?  No    Do you have a diagnosis of diabetes?  No    Do you have a diagnosis of cystic fibrosis (CF)?  No    On a regular basis do you go 3 -5 days between bowel movements?  Yes (Extended Prep)    BMI > 40?  No    Preferred Pharmacy:      [facility] - [place], [place] - [address]  [address] [place] [address]  Phone: [phone] Fax: [phone]      Final Scheduling Details     Procedure scheduled  Colonoscopy    Surgeon:  Danyell      Date of procedure:  09/11/2024     Pre-OP / PAC:   No - Not required for this site.    Location  [facility] - Per order.    Sedation   MAC/Deep Sedation - Per order.      Patient Reminders:   You will receive a call from a Nurse to review instructions and health history.  This assessment must be completed prior to your procedure.  Failure to complete the Nurse assessment may result in the procedure being cancelled.      On the day of your procedure, please designate an adult(s) who can drive you home stay with you for the next 24 hours. The medicines used in the exam will make you sleepy. You will not be able to drive.      You cannot take public transportation, ride share services, or non-medical taxi service without a responsible caregiver.  Medical transport services are allowed with the requirement that a responsible caregiver will receive you at your destination.  We require that drivers and caregivers are confirmed prior to your procedure.

## 2024-09-09 ENCOUNTER — TELEPHONE (OUTPATIENT)
Dept: GASTROENTEROLOGY | Facility: CLINIC | Age: 54
End: 2024-09-09
Payer: COMMERCIAL

## 2024-09-09 NOTE — TELEPHONE ENCOUNTER
Per scheduling notes, colonoscopy procedure has been rescheduled to 10/30/24.    Felicia Christian RN  Endoscopy Procedure Pre-Assessment RN  [phone], option 4

## 2024-09-19 ENCOUNTER — VIRTUAL VISIT (OUTPATIENT)
Dept: INTERNAL MEDICINE | Facility: CLINIC | Age: 54
End: 2024-09-19
Payer: COMMERCIAL

## 2024-09-19 DIAGNOSIS — R19.5 CHANGE IN STOOL: Primary | ICD-10-CM

## 2024-09-19 DIAGNOSIS — R63.0 DECREASED APPETITE: ICD-10-CM

## 2024-09-19 PROCEDURE — 99214 OFFICE O/P EST MOD 30 MIN: CPT | Mod: 95 | Performed by: NURSE PRACTITIONER

## 2024-09-20 PROCEDURE — 87507 IADNA-DNA/RNA PROBE TQ 12-25: CPT | Performed by: NURSE PRACTITIONER

## 2024-09-20 PROCEDURE — 87177 OVA AND PARASITES SMEARS: CPT | Performed by: NURSE PRACTITIONER

## 2024-09-21 DIAGNOSIS — A49.8 E COLI INFECTION: Primary | ICD-10-CM

## 2024-09-21 LAB

## 2024-09-21 RX ORDER — AZITHROMYCIN 250 MG/1
TABLET, FILM COATED ORAL
Qty: 6 TABLET | Refills: 0 | Status: SHIPPED | OUTPATIENT
Start: 2024-09-21 | End: 2024-09-22

## 2024-09-22 ENCOUNTER — NURSE TRIAGE (OUTPATIENT)
Dept: NURSING | Facility: CLINIC | Age: 54
End: 2024-09-22
Payer: COMMERCIAL

## 2024-09-22 ENCOUNTER — MYC MEDICAL ADVICE (OUTPATIENT)
Dept: FAMILY MEDICINE | Facility: CLINIC | Age: 54
End: 2024-09-22
Payer: COMMERCIAL

## 2024-09-22 DIAGNOSIS — A49.8 E COLI INFECTION: ICD-10-CM

## 2024-09-22 RX ORDER — AZITHROMYCIN 250 MG/1
TABLET, FILM COATED ORAL
Qty: 6 TABLET | Refills: 0 | Status: SHIPPED | OUTPATIENT
Start: 2024-09-22 | End: 2024-09-27

## 2024-09-22 NOTE — TELEPHONE ENCOUNTER
Patient is following up on a message in my chart.  A prescription was sent to [facility] in [place] which is closed and patient is requesting the prescription for Zithromax be sent to [facility] in [place] on [address] as the pharmacy is open today until 10:00 PM.   Patient had a virtual visit on 9/19/2024 with Dorcas Nunez CNP and was not aware of the prescription.  Patient states that they were waiting for labs.  Please send prescription as soon as possible so patient can  this today.      Additional Information   Negative: [1] Intentional drug overdose AND [2] suicidal thoughts or ideas   Negative: Drug overdose and triager unable to answer question   Negative: Caller requesting a renewal or refill of a medicine patient is currently taking   Negative: Caller requesting information unrelated to medicine   Negative: Caller requesting information about COVID-19 Vaccine   Negative: Caller requesting information about Emergency Contraception   Negative: Caller requesting information about Combined Birth Control Pills   Negative: Caller requesting information about Progestin Birth Control Pills   Negative: Caller requesting information about Post-Op pain or medicines   Negative: Caller requesting a prescription antibiotic (such as Penicillin) for Strep throat and has a positive culture result   Negative: Caller requesting a prescription anti-viral med (such as Tamiflu) and has influenza (flu) symptoms   Negative: Immunization reaction suspected   Negative: Rash while taking a medicine or within 3 days of stopping it   Negative: [1] Asthma and [2] having symptoms of asthma (cough, wheezing, etc.)   Negative: [1] Symptom of illness (e.g., headache, abdominal pain, earache, vomiting) AND [2] more than mild   Negative: Breastfeeding questions about mother's medicines and diet   Negative: MORE THAN A DOUBLE DOSE of a prescription or over-the-counter (OTC) drug   Negative: [1] DOUBLE DOSE (an  extra dose or lesser amount) of prescription drug AND [2] any symptoms (e.g., dizziness, nausea, pain, sleepiness)   Negative: [1] DOUBLE DOSE (an extra dose or lesser amount) of over-the-counter (OTC) drug AND [2] any symptoms (e.g., dizziness, nausea, pain, sleepiness)   Negative: Took another person's prescription drug   Negative: [1] DOUBLE DOSE (an extra dose or lesser amount) of prescription drug AND [2] NO symptoms  (Exception: A double dose of antibiotics.)   Negative: Diabetes drug error or overdose (e.g., took wrong type of insulin or took extra dose)   Negative: [1] Prescription not at pharmacy AND [2] was prescribed by PCP recently (Exception: Triager has access to EMR and prescription is recorded there. Go to Home Care and confirm for pharmacy.)   Negative: [1] Pharmacy calling with prescription question AND [2] triager unable to answer question   Negative: [1] Caller has URGENT medicine question about med that PCP or specialist prescribed AND [2] triager unable to answer question   Negative: Medicine patch causing local rash or itching   Negative: [1] Caller has medicine question about med NOT prescribed by PCP AND [2] triager unable to answer question (e.g., compatibility with other med, storage)   Negative: Prescription request for new medicine (not a refill)    Protocols used: Medication Question Call-A-

## 2024-09-23 LAB
O+P STL MICRO: NEGATIVE
SPECIMEN TYPE: NORMAL

## 2024-09-23 NOTE — TELEPHONE ENCOUNTER
Tasneem is calling back and states that she is needing prescription for Azithromycin 250 mg tablet to be sent to [facility] on [address] [facility] as the other pharmacy closed early and patient could not get the medication.  New pharmacy is open 24 hours.      Jeanne Du RN/FNA

## 2024-09-24 NOTE — TELEPHONE ENCOUNTER
Dr. Yee --    Please review and advise:   9/21. Patient diagnosed with Enteropathogenic E. coli (EPEC) in stool.   Placed on a z-pack.     9/22. Patient sent in [employer] message questioning if the z-pack was necessary.     9/24. Writer called patient to verify if she is taking the z-zach.   Patient is taking the z-zach for the e. Coli.     Symptoms: Patient does feel sick (nausea, headache, dizzy, tired, no energy like body is fighting something).   Patient stayed home from work today. Slept 10 hours today.   Patient states that she may have had a slight pink tinge to her urine this morning.   Patient also states that she may have Costochondritis, inflammation of lungs at bottom of left lung. Present for a few days.     Patient would appreciate a return call.   Ok to [facility] for patient.     Thank you, Dr. Yee.   Edilia Cam, BRITTANYN RN  [facility]

## 2024-09-24 NOTE — TELEPHONE ENCOUNTER
Contact pt through [employer]. Pt has already picked up her rx. No further action needed.     MAGI Parry, ealth [facility] September 24, 2024 10:58 AM

## 2024-09-29 ENCOUNTER — E-VISIT (OUTPATIENT)
Dept: INTERNAL MEDICINE | Facility: CLINIC | Age: 54
End: 2024-09-29
Payer: COMMERCIAL

## 2024-09-29 DIAGNOSIS — J06.9 UPPER RESPIRATORY TRACT INFECTION, UNSPECIFIED TYPE: Primary | ICD-10-CM

## 2024-09-29 PROCEDURE — 99207 PR NON-BILLABLE SERV PER CHARTING: CPT | Performed by: NURSE PRACTITIONER

## 2024-09-30 NOTE — PATIENT INSTRUCTIONS
Dear Tasneem,    We are sorry you are not feeling well. Based on the responses you provided, it is recommended that you be seen in-person in clinic so we can better evaluate your symptoms. Please schedule this visit in [employer]. You will have a Schedule Now button in [employer] to help with scheduling this appointment. Otherwise, you can call [phone] to schedule an appointment.     You will not be charged for this eVisit. Thank you for trusting us with your care.     EDDIE Hodgson CNP

## 2024-10-01 ENCOUNTER — PATIENT OUTREACH (OUTPATIENT)
Dept: CARE COORDINATION | Facility: CLINIC | Age: 54
End: 2024-10-01
Payer: COMMERCIAL

## 2024-10-01 NOTE — TELEPHONE ENCOUNTER
Patient has since been seen again elsewhere    Lakeisha Ta RN  [facility]

## 2024-10-03 ENCOUNTER — OFFICE VISIT (OUTPATIENT)
Dept: URGENT CARE | Facility: URGENT CARE | Age: 54
End: 2024-10-03
Payer: COMMERCIAL

## 2024-10-03 VITALS
BODY MASS INDEX: 19.29 KG/M2 | TEMPERATURE: 97.9 F | RESPIRATION RATE: 16 BRPM | SYSTOLIC BLOOD PRESSURE: 128 MMHG | DIASTOLIC BLOOD PRESSURE: 82 MMHG | HEIGHT: 64 IN | OXYGEN SATURATION: 99 % | HEART RATE: 102 BPM | WEIGHT: 113 LBS

## 2024-10-03 DIAGNOSIS — J01.90 ACUTE BACTERIAL RHINOSINUSITIS: Primary | ICD-10-CM

## 2024-10-03 DIAGNOSIS — B96.89 ACUTE BACTERIAL RHINOSINUSITIS: Primary | ICD-10-CM

## 2024-10-03 DIAGNOSIS — J32.0 CHRONIC MAXILLARY SINUSITIS: ICD-10-CM

## 2024-10-03 PROCEDURE — 99213 OFFICE O/P EST LOW 20 MIN: CPT | Performed by: NURSE PRACTITIONER

## 2024-10-04 NOTE — PROGRESS NOTES
"Assessment & Plan     1. Chronic maxillary sinusitis      2. Acute bacterial rhinosinusitis    Home care for sinusitis includes; antibiotic take Augmentin 2 times daily with food as this may cause stomach upset. Please take with a probiotic (not directly with) two hours prior or after, to protect good bacteria in colon.   Humidifier in room if available. May use flonase daily to help decrease swelling and dry up drainage. Recommend saline lavage to help remove mucous and moisturize sinuses.     Please follow up in clinic, with your primary care provider if symptoms not improving with treatment.       - amoxicillin-clavulanate (AUGMENTIN) 875-125 MG tablet; Take 1 tablet by mouth 2 times daily for 7 days.  Dispense: 14 tablet; Refill: 0      EDDIE Skinner [facility] [place]    Bakari Boateng is a 54 year old female who presents to clinic today for the following health issues:  Chief Complaint   Patient presents with    Urgent Care     Concern of sinus or URI, had Amox in June. H/O allergies, feels like sinuses have collapsed on left side. Worse over past 2 weeks. Had some mucus in her stool and had some testing done. Then was put on Z-pack. Not feeling better. Feels its her sinuses, hard time getting full breath. Ear bothering her.        HPI    Patient with history of ecoli stool infection states she was given azithromycin for this she states she also had been having sinus congestion and mild left ear pain and pressure.   History of chronic sinusitis. She has been referred to ENT in past but did not see them.     Review of Systems  Constitutional, HEENT, cardiovascular, pulmonary, gi and gu systems are negative, except as otherwise noted.      Objective    /82   Pulse 102   Temp 97.9  F (36.6  C) (Temporal)   Resp 16   Ht 1.626 m (5' 4\")   Wt 51.3 kg (113 lb)   SpO2 99%   BMI 19.40 kg/m    Physical Exam   GENERAL: alert and no distress  EYES: Eyes grossly " normal to inspection, PERRL and conjunctivae and sclerae normal  HENT: normal cephalic/atraumatic, ear canals and TM's normal,  mouth without ulcers or lesions, oropharynx clear, and oral mucous membranes moist nasal swelling bilateral left greater than right with tenderness with palpation maxillary  NECK: no adenopathy, no asymmetry, masses, or scars, and thyroid normal to palpation  RESP: lungs clear to auscultation - no rales, rhonchi or wheezes  CV: regular rate and rhythm, normal S1 S2, no S3 or S4, no murmur, click or rub, no peripheral edema  ABDOMEN: soft, nontender, no hepatosplenomegaly, no masses and bowel sounds normal  MS: no gross musculoskeletal defects noted, no edema  SKIN: no suspicious lesions or rashes

## 2024-10-05 ENCOUNTER — TELEPHONE (OUTPATIENT)
Dept: FAMILY MEDICINE | Facility: CLINIC | Age: 54
End: 2024-10-05
Payer: COMMERCIAL

## 2024-10-05 DIAGNOSIS — J01.40 ACUTE NON-RECURRENT PANSINUSITIS: ICD-10-CM

## 2024-10-05 DIAGNOSIS — J01.40 ACUTE NON-RECURRENT PANSINUSITIS: Primary | ICD-10-CM

## 2024-10-05 RX ORDER — AMOXICILLIN AND CLAVULANATE POTASSIUM 400; 57 MG/1; MG/1
2 TABLET, CHEWABLE ORAL 2 TIMES DAILY
Qty: 40 TABLET | Refills: 0 | Status: CANCELLED | OUTPATIENT
Start: 2024-10-05

## 2024-10-05 RX ORDER — AMOXICILLIN AND CLAVULANATE POTASSIUM 400; 57 MG/1; MG/1
2 TABLET, CHEWABLE ORAL 2 TIMES DAILY
Qty: 40 TABLET | Refills: 0 | Status: SHIPPED | OUTPATIENT
Start: 2024-10-05 | End: 2024-10-29

## 2024-10-05 RX ORDER — AMOXICILLIN AND CLAVULANATE POTASSIUM 400; 57 MG/1; MG/1
2 TABLET, CHEWABLE ORAL 2 TIMES DAILY
Qty: 40 TABLET | Refills: 0 | Status: SHIPPED | OUTPATIENT
Start: 2024-10-05 | End: 2024-10-05

## 2024-10-05 NOTE — TELEPHONE ENCOUNTER
pharmacy does not have the chewable medication. VML for patient. Would she like it sent to a different pharmacy. Chato Duvall ma

## 2024-10-05 NOTE — TELEPHONE ENCOUNTER
FYI - Status Update    Who is Calling: patient    Update: would like chewable version of amxioclan    Does caller want a call/response back: Yes     Could we send this information to you in [employer] or would you prefer to receive a phone call?:   Patient would prefer a phone call   Okay to leave a detailed message?: Yes at Home number on file [phone] (home)

## 2024-10-05 NOTE — TELEPHONE ENCOUNTER
Pt calling back to update that phone listed in original message is not working & would like to be contacted at:   [phone] (husbands cell phone), voicemail is available.     Pt would also like this routed to [facility] pharmacy  & would like it done before they close at 12:30pm.

## 2024-10-28 ENCOUNTER — TELEPHONE (OUTPATIENT)
Dept: FAMILY MEDICINE | Facility: CLINIC | Age: 54
End: 2024-10-28
Payer: COMMERCIAL

## 2024-10-28 ENCOUNTER — TELEPHONE (OUTPATIENT)
Dept: GASTROENTEROLOGY | Facility: CLINIC | Age: 54
End: 2024-10-28
Payer: COMMERCIAL

## 2024-10-28 ENCOUNTER — MYC MEDICAL ADVICE (OUTPATIENT)
Dept: FAMILY MEDICINE | Facility: CLINIC | Age: 54
End: 2024-10-28
Payer: COMMERCIAL

## 2024-10-28 NOTE — TELEPHONE ENCOUNTER
Reason for Call:  Appointment Request    Patient requesting this type of appt: Chronic Diease Management/Medication/Follow-Up    Requested provider: Gem Yee    Reason patient unable to be scheduled: Not within requested timeframe     Sinusitis     PCP is booked until 01/02/2024.     When does patient want to be seen/preferred time:     Anytime before 12PM M-F     Comments: n/a    Could we send this information to you in [employer] or would you prefer to receive a phone call?:   Patient would prefer a phone call   Okay to leave a detailed message?: Yes at Cell number on file:        Telephone Information:   Mobile [phone]    or Other phone number:      Call taken on 10/28/2024 at 12:15 PM by Celio Chakraborty

## 2024-10-28 NOTE — TELEPHONE ENCOUNTER
FYI - Status Update    Who is Calling: patient    Update:     Tasneem found an appt. with Gem Gerard for 11/19/2024 at 9:40AM.    Does caller want a call/response back: No    Could we send this information to you in [employer] or would you prefer to receive a phone call?:   Patient would prefer a phone call     Okay to leave a detailed message?: Yes at Cell number on file:    Telephone Information:   Mobile [phone]

## 2024-10-28 NOTE — TELEPHONE ENCOUNTER
Caller: Tasneem Mccollum     Reason for Reschedule/Cancellation   (please be detailed, any staff messages or encounters to note?): not feeling well/nurses suggest she reschedule      Prior to reschedule please review:  Ordering Provider: Nirali Farias  Sedation Determined: mac   Does patient have any ASC Exclusions, please identify?: n      Notes on Cancelled Procedure:  Procedure: Lower Endoscopy [Colonoscopy]   Date: 10/30  Location: [facility]; [address], [place] [address]  Surgeon: Danyell      Rescheduled: Yes,   Procedure: Lower Endoscopy [Colonoscopy]    Date: 12/18   Location: [facility]; [address], [place] [address]   Surgeon: Danyell   Sedation Level Scheduled  mac ,  Reason for Sedation Level ordered   Instructions updated and sent: y     Does patient need PAC or Pre -Op Rescheduled? : no       Did you cancel or rescheduled an EUS procedure? No.

## 2024-10-28 NOTE — TELEPHONE ENCOUNTER
Routing to RN team to triage Sinusitis patient states she has     Patient is looking to be seen asap during morning clinic-

## 2024-10-29 ENCOUNTER — OFFICE VISIT (OUTPATIENT)
Dept: FAMILY MEDICINE | Facility: CLINIC | Age: 54
End: 2024-10-29
Payer: COMMERCIAL

## 2024-10-29 VITALS
TEMPERATURE: 97.8 F | WEIGHT: 114.9 LBS | RESPIRATION RATE: 12 BRPM | OXYGEN SATURATION: 99 % | HEIGHT: 64 IN | BODY MASS INDEX: 19.62 KG/M2 | SYSTOLIC BLOOD PRESSURE: 116 MMHG | DIASTOLIC BLOOD PRESSURE: 84 MMHG | HEART RATE: 101 BPM

## 2024-10-29 DIAGNOSIS — J34.89 SINUS PRESSURE: ICD-10-CM

## 2024-10-29 DIAGNOSIS — Z23 NEED FOR VACCINATION: ICD-10-CM

## 2024-10-29 DIAGNOSIS — R42 VERTIGO: Primary | ICD-10-CM

## 2024-10-29 PROCEDURE — 91320 SARSCV2 VAC 30MCG TRS-SUC IM: CPT | Performed by: FAMILY MEDICINE

## 2024-10-29 PROCEDURE — 90480 ADMN SARSCOV2 VAC 1/ONLY CMP: CPT | Performed by: FAMILY MEDICINE

## 2024-10-29 PROCEDURE — 90471 IMMUNIZATION ADMIN: CPT | Performed by: FAMILY MEDICINE

## 2024-10-29 PROCEDURE — 90673 RIV3 VACCINE NO PRESERV IM: CPT | Performed by: FAMILY MEDICINE

## 2024-10-29 PROCEDURE — 99213 OFFICE O/P EST LOW 20 MIN: CPT | Mod: 25 | Performed by: FAMILY MEDICINE

## 2024-10-29 RX ORDER — AZELASTINE 1 MG/ML
1 SPRAY, METERED NASAL 2 TIMES DAILY
Qty: 30 ML | Refills: 11 | Status: SHIPPED | OUTPATIENT
Start: 2024-10-29

## 2024-10-29 RX ORDER — FLUTICASONE PROPIONATE 50 MCG
1-2 SPRAY, SUSPENSION (ML) NASAL DAILY
Qty: 16 G | Refills: 11 | Status: SHIPPED | OUTPATIENT
Start: 2024-10-29

## 2024-10-29 ASSESSMENT — PAIN SCALES - GENERAL: PAINLEVEL_OUTOF10: NO PAIN (0)

## 2024-10-29 NOTE — PROGRESS NOTES
Assessment & Plan     Sinus pressure  Persistent left-sided nasal/sinus pressure and sense of obstruction.  I recommend ENT evaluation.  She just completed a 3-week course of Augmentin with no improvement and I doubt this represents a sinus infection at this point.  our focus today is on decongesting the nasal mucosa with nasal steroid spray and nasal antihistamine spray.    - fluticasone (FLONASE) 50 MCG/ACT nasal spray  Dispense: 16 g; Refill: 11  - azelastine (ASTELIN) 0.1 % nasal spray  Dispense: 30 mL; Refill: 11  - Adult ENT  Referral    Vertigo  - Adult ENT  Referral    Need for vaccination  - INFLUENZA VACCINE TRIVALENT(FLUBLOK)  - COVID-19 12+ (PFIZER)    See Patient Instructions    The longitudinal plan of care for the diagnosis(es)/condition(s) as documented were addressed during this visit. Due to the added complexity in care, I will continue to support Tasneem in the subsequent management and with ongoing continuity of care.    Bakari Boateng is a 54 year old, presenting for the following health issues:  Sinus Problem      10/29/2024     8:06 AM   Additional Questions   Roomed by Sharlene HARVEY     Sinus Problem     History of Present Illness       Reason for visit:  Sinus    She eats 2-3 servings of fruits and vegetables daily.She consumes 0 sweetened beverage(s) daily.She exercises with enough effort to increase her heart rate 9 or less minutes per day.  She exercises with enough effort to increase her heart rate 3 or less days per week.   She is taking medications regularly.     Longstanding pressure and congestion in her left nostril/sinus for past year and half.  She notices that if she presses on the outside of her upper left nostril she can open up the nasal passage on that side.    She noticed increased pressure in left sinus below left eye in August and she was seen in urgent care earlier this month.   Was prescribed a 3-week course of Augmentin because she had completed a 7-day  "course of Augmentin for same symptoms in June.    She finished the recent 3-week course of Augmentin but symptoms have persisted   Developed diffuse body aches 3 days ago.  Head feels heavy.  Associated with left-sided ear fullness.  Mild cough  No nasal drainage.  No fever.  Feels dizzy.  Sense of imbalance which is ongoing and now she's starting to have episodes of vertigo - brought on with head movement.  Had to cancel her colonoscopy due to the acute infection.          Objective    /84   Pulse 101   Temp 97.8  F (36.6  C) (Temporal)   Resp 12   Ht 1.629 m (5' 4.13\")   Wt 52.1 kg (114 lb 14.4 oz)   LMP  (LMP Unknown)   SpO2 99%   BMI 19.64 kg/m    Body mass index is 19.64 kg/m .  Physical Exam   GEN:  no apparent distress  EYES: sclerae and conjunctivae clear with no discharge  ENT: external ears and nose without lesions or scars, TM's and canals clear bilaterally, nares without drainage, and oropharynx clear with moist mucus membranes and normal landmarks  NECK:  Supple without adenopathy, mass, or thyromegaly  LUNGS:  normal respiratory effort, and lungs clear to auscultation bilaterally - no rales, rhonchi or wheezes  CV: regular rate and rhythm, normal S1 S2, no S3 or S4, and no murmur, click or rub           Signed Electronically by: Gem Yee MD    "

## 2024-10-29 NOTE — PATIENT INSTRUCTIONS
"  FYI:  My schedule has been booking out very far in advance (2 months).  I apologize for the lack of timely access.  If you need to be seen for a chronic condition or preventive (wellness) visit, please be sure to schedule that appointment 2-3 months in advance.  If you have a concern that you feel cannot wait until my next available appointment (such as a hospital follow-up, pre-op, or new symptom of concern) please call clinic at [phone] and say \"my care team.\"  If you are still told that I have no availability please ask to speak to a [place]  or Nurse who can often offer you an appointment with me within a week or so.  Or you can send me a [email or web address] message.  The trick is to avoid speaking with central scheduling.    I do ask that all patients who are taking chronic medications for conditions that I am managing schedule an in-person visit with me at least once a year.        "

## 2024-10-29 NOTE — TELEPHONE ENCOUNTER
Patient has since been seen virtually and given refills    Lakeisha Ta RN  [facility]

## 2024-10-29 NOTE — TELEPHONE ENCOUNTER
See other telephone encounter dated 10/28/24.    Patient is scheduled with PCP this morning at 0750.  JOY Islas, RN-BC  [facility]

## 2024-10-29 NOTE — TELEPHONE ENCOUNTER
Video visit scheduled with Dr. Yee for today at 0810.    Writer called patient, offered video visit at 0810 with Dr. Yee, or in-person visit with Dr. Yee at 0750.    Patient requested in-person visit with Dr. Yee at 0750.  Visit date, time and location confirmed with patient.    BRITTANY IslasN, RN-[facility]

## 2024-10-31 ENCOUNTER — TRANSFERRED RECORDS (OUTPATIENT)
Dept: HEALTH INFORMATION MANAGEMENT | Facility: CLINIC | Age: 54
End: 2024-10-31
Payer: COMMERCIAL

## 2024-11-20 ENCOUNTER — OFFICE VISIT (OUTPATIENT)
Dept: OPTOMETRY | Facility: CLINIC | Age: 54
End: 2024-11-20
Payer: COMMERCIAL

## 2024-11-20 DIAGNOSIS — H11.32 SUBCONJUNCTIVAL HEMORRHAGE OF LEFT EYE: Primary | ICD-10-CM

## 2024-11-20 DIAGNOSIS — H04.129 DRY EYE: ICD-10-CM

## 2024-11-20 DIAGNOSIS — H10.13 ALLERGIC CONJUNCTIVITIS, BILATERAL: ICD-10-CM

## 2024-11-20 PROCEDURE — 92012 INTRM OPH EXAM EST PATIENT: CPT | Performed by: OPTOMETRIST

## 2024-11-20 RX ORDER — OLOPATADINE HYDROCHLORIDE 1 MG/ML
1 SOLUTION/ DROPS OPHTHALMIC 2 TIMES DAILY
Qty: 5 ML | Refills: 11 | Status: SHIPPED | OUTPATIENT
Start: 2024-11-20

## 2024-11-20 ASSESSMENT — VISUAL ACUITY
OS_SC: 20/20
METHOD: SNELLEN - LINEAR

## 2024-11-20 ASSESSMENT — CUP TO DISC RATIO: OS_RATIO: 0.5

## 2024-11-20 ASSESSMENT — SLIT LAMP EXAM - LIDS: COMMENTS: MEIBOMIAN GLAND DYSFUNCTION

## 2024-11-20 ASSESSMENT — EXTERNAL EXAM - RIGHT EYE: OD_EXAM: NORMAL

## 2024-11-20 ASSESSMENT — EXTERNAL EXAM - LEFT EYE: OS_EXAM: NORMAL

## 2024-11-20 NOTE — LETTER
11/20/2024      Tasneem Mccollum  [address]  [facility] [address]      Dear Colleague,    Thank you for referring your patient, Tasneem Mccollum, to the [facility]. Please see a copy of my visit note below.    Chief Complaint   Patient presents with     Red Eye Left Eye   Patient presents with a red eye in the outer corner of her left eye x 4 days, improving  . She noticed a few days ago after working out and then used a sauna so is wondering if that caused it. She states when she moves her eye it feels irritated. She has been feeling dizzy and was wondering if that has anything to do with it too.  Being seen at dizzy clinic and by OT  She states her eyes feel dry and itchy a lot. She was using otc allergy drops but ran out awhile ago and has not used anything since   Generic patanol    Do you wear contact lenses? No        Nataliya Mcor - Optometric Assistant      See Review Of Systems       Medical, surgical and family histories reviewed and updated 11/20/2024.       Feels pressure around eye, seen by ENT  Had sinusitis   Dizziness     OBJECTIVE: See Ophthalmology exam    ASSESSMENT:    ICD-10-CM    1. Subconjunctival hemorrhage of left eye  H11.32       2. Allergic conjunctivitis, bilateral  H10.13 olopatadine (PATANOL) 0.1 % ophthalmic solution      3. Dry eye  H04.129        No relation to dizziness  PLAN:  Prescription patanol two times daily each eye   Warm compresses as needed , artificial tears  as needed   If more than two times daily use nonpreserved artificial tears    Would start with 2 patanol, 2 artificial tears      Mel Lobato OD     Again, thank you for allowing me to participate in the care of your patient.        Sincerely,        Mel Lobato, OD

## 2024-11-20 NOTE — PATIENT INSTRUCTIONS
Allergy drops two times daily   Artificial tears  two times daily or more if needed  I recommend using artificial tears for your dry eye.   There are over the counter drops that work well and may be used up to 4 x daily. ( blink, systane , refresh, thera tears)   If you need more than 4 drops daily, use a preservative free product which come in individual vials and may be used for 24 hours until finished and discarded.    You may also instill a small bead of over the counter gel or ointment in the lower lid at bedtime to help with early morning dryness such as ( refresh pm, lacrilube, thera tears gel, genteal gel)  Do not use red eye relief products such as visine or clear eyes for dry eye         You have a broken blood vessel in your eye.  The redness may take a1-3 weeks to resolve.  Visine or Clear Eyes drops will not make the redness go away any faster.  Sometimes it looks worse before it gets better.

## 2024-11-20 NOTE — PROGRESS NOTES
Chief Complaint   Patient presents with    Red Eye Left Eye   Patient presents with a red eye in the outer corner of her left eye x 4 days, improving  . She noticed a few days ago after working out and then used a sauna so is wondering if that caused it. She states when she moves her eye it feels irritated. She has been feeling dizzy and was wondering if that has anything to do with it too.  Being seen at dizzy clinic and by OT  She states her eyes feel dry and itchy a lot. She was using otc allergy drops but ran out awhile ago and has not used anything since   Generic patanol    Do you wear contact lenses? No        Nataliya Paul - Optometric Assistant      See Review Of Systems       Medical, surgical and family histories reviewed and updated 11/20/2024.       Feels pressure around eye, seen by ENT  Had sinusitis   Dizziness     OBJECTIVE: See Ophthalmology exam    ASSESSMENT:    ICD-10-CM    1. Subconjunctival hemorrhage of left eye  H11.32       2. Allergic conjunctivitis, bilateral  H10.13 olopatadine (PATANOL) 0.1 % ophthalmic solution      3. Dry eye  H04.129        No relation to dizziness  PLAN:  Prescription patanol two times daily each eye   Warm compresses as needed , artificial tears  as needed   If more than two times daily use nonpreserved artificial tears    Would start with 2 patanol, 2 artificial tears      Mel Lobato OD

## 2024-11-21 ENCOUNTER — TELEPHONE (OUTPATIENT)
Dept: GASTROENTEROLOGY | Facility: CLINIC | Age: 54
End: 2024-11-21

## 2024-11-21 NOTE — TELEPHONE ENCOUNTER
Pt would like to r/s. Offered 11/27 @ [facility] for MAC appt. Pt checking with isabella. Spot held. Pt will need preop scheduled (11/26 only available)

## 2024-12-19 ENCOUNTER — PATIENT OUTREACH (OUTPATIENT)
Dept: CARE COORDINATION | Facility: CLINIC | Age: 54
End: 2024-12-19
Payer: COMMERCIAL

## 2025-02-18 ENCOUNTER — HOSPITAL ENCOUNTER (OUTPATIENT)
Age: 55
End: 2025-02-18
Payer: COMMERCIAL

## 2025-04-01 ENCOUNTER — TELEPHONE (OUTPATIENT)
Dept: GASTROENTEROLOGY | Facility: CLINIC | Age: 55
End: 2025-04-01
Payer: COMMERCIAL

## 2025-04-01 DIAGNOSIS — Z12.11 ENCOUNTER FOR SCREENING COLONOSCOPY: Primary | ICD-10-CM

## 2025-04-01 RX ORDER — BISACODYL 5 MG/1
TABLET, DELAYED RELEASE ORAL
Qty: 4 TABLET | Refills: 0 | Status: SHIPPED | OUTPATIENT
Start: 2025-04-01

## 2025-04-01 NOTE — TELEPHONE ENCOUNTER
"Rescheduled Colonoscopy  Patient has rescheduled multiple times due to various reasons including: no transportation, pt unwell, and ill child.  Of note, patient completed the prior nurse pre assessment call for the previously rescheduled procedure back in December 2024.    Pre visit planning completed.      Procedure details:    Patient scheduled for Colonoscopy on 4/16/25.     Arrival time: 1330. Procedure time 1430    Facility location: [facility]; [address], [place] [address]. Check in location: 2nd Floor.    Sedation type: MAC - per order: \"Hx of Poor Sedation\".  Noted hx poor sedation/failed/aborted colonoscopy procedure per 2/18/21 colonoscopy report: \"The patient tolerated the procedure poorly due to the patient's excessive discomfort during the procedure.\" and \"Incomplete exam due to excessive discomfort despite escalating doses of sedation, patient position changes, and abdominal pressure.\" and further notes  \"Needs to be done under MAC and with double prep.\"    Pre op exam needed? No. Not required at [facility] location.    Indication for procedure: Screening      Chart review:     Electronic implanted devices? No    Recent diagnosis of diverticulitis within the last 6 weeks? No      Medication review:    Diabetic? No    Anticoagulants? No    Weight loss medication/injectable? No GLP-1 medication per patient's medication list. Nursing to verify with pre-assessment call.    Other medication HOLDING recommendations:  Ferrous sulfate (iron supplements): HOLD 7 days before procedure.      Prep for procedure:    Bowel prep recommendation: Extended Golytely.   Bowel prep resent to [facility] - [place], [place] - [address].  Due to: poor prep/inadequate bowel prep in the past and provider request/ordered. Per 2/18/21 colonoscopy report: \"Needs to be done under MAC and with double prep.\"    Prep instructions resent via teresa Christian, " RN  Endoscopy Procedure Pre Assessment   [phone] option 3

## 2025-04-02 NOTE — TELEPHONE ENCOUNTER
Attempted to contact patient in order to complete pre assessment questions.     No answer. Left message to return call to [phone] option 3.    Callback communication sent via [employer].    Elodia Mai RN

## 2025-04-16 ENCOUNTER — TRANSFERRED RECORDS (OUTPATIENT)
Dept: HEALTH INFORMATION MANAGEMENT | Facility: CLINIC | Age: 55
End: 2025-04-16
Payer: COMMERCIAL

## 2025-04-16 PROCEDURE — 88305 TISSUE EXAM BY PATHOLOGIST: CPT | Mod: TC,ORL | Performed by: COLON & RECTAL SURGERY

## 2025-04-17 ENCOUNTER — LAB REQUISITION (OUTPATIENT)
Dept: LAB | Facility: CLINIC | Age: 55
End: 2025-04-17
Payer: COMMERCIAL

## 2025-04-18 PROCEDURE — 88305 TISSUE EXAM BY PATHOLOGIST: CPT | Mod: 26 | Performed by: PATHOLOGY

## 2025-06-02 ENCOUNTER — PATIENT OUTREACH (OUTPATIENT)
Dept: CARE COORDINATION | Facility: CLINIC | Age: 55
End: 2025-06-02
Payer: COMMERCIAL

## 2025-06-16 ENCOUNTER — PATIENT OUTREACH (OUTPATIENT)
Dept: CARE COORDINATION | Facility: CLINIC | Age: 55
End: 2025-06-16
Payer: COMMERCIAL

## 2025-07-01 ENCOUNTER — PATIENT OUTREACH (OUTPATIENT)
Dept: CARE COORDINATION | Facility: CLINIC | Age: 55
End: 2025-07-01
Payer: COMMERCIAL

## 2025-07-02 ENCOUNTER — ANCILLARY PROCEDURE (OUTPATIENT)
Dept: BONE DENSITY | Facility: CLINIC | Age: 55
End: 2025-07-02
Attending: FAMILY MEDICINE
Payer: COMMERCIAL

## 2025-07-02 DIAGNOSIS — M85.9 LOW BONE DENSITY FOR AGE: ICD-10-CM

## 2025-07-02 DIAGNOSIS — Z78.0 POSTMENOPAUSAL STATUS: ICD-10-CM

## 2025-07-02 PROCEDURE — 77080 DXA BONE DENSITY AXIAL: CPT | Performed by: INTERNAL MEDICINE

## 2025-07-03 ENCOUNTER — RESULTS FOLLOW-UP (OUTPATIENT)
Dept: FAMILY MEDICINE | Facility: CLINIC | Age: 55
End: 2025-07-03

## 2025-07-21 SDOH — HEALTH STABILITY: PHYSICAL HEALTH: ON AVERAGE, HOW MANY DAYS PER WEEK DO YOU ENGAGE IN MODERATE TO STRENUOUS EXERCISE (LIKE A BRISK WALK)?: 4 DAYS

## 2025-07-21 SDOH — HEALTH STABILITY: PHYSICAL HEALTH: ON AVERAGE, HOW MANY MINUTES DO YOU ENGAGE IN EXERCISE AT THIS LEVEL?: 20 MIN

## 2025-07-21 ASSESSMENT — SOCIAL DETERMINANTS OF HEALTH (SDOH): HOW OFTEN DO YOU GET TOGETHER WITH FRIENDS OR RELATIVES?: ONCE A WEEK

## 2025-07-23 ENCOUNTER — LAB (OUTPATIENT)
Dept: LAB | Facility: CLINIC | Age: 55
End: 2025-07-23
Payer: COMMERCIAL

## 2025-07-23 ENCOUNTER — RESULTS FOLLOW-UP (OUTPATIENT)
Dept: FAMILY MEDICINE | Facility: CLINIC | Age: 55
End: 2025-07-23

## 2025-07-23 DIAGNOSIS — Z86.39 HISTORY OF IRON DEFICIENCY: ICD-10-CM

## 2025-07-23 DIAGNOSIS — Z13.1 SCREENING FOR DIABETES MELLITUS: ICD-10-CM

## 2025-07-23 DIAGNOSIS — M85.9 LOW BONE DENSITY FOR AGE: ICD-10-CM

## 2025-07-23 DIAGNOSIS — Z13.220 SCREENING FOR CHOLESTEROL LEVEL: ICD-10-CM

## 2025-07-23 DIAGNOSIS — M81.0 OSTEOPOROSIS OF MULTIPLE SITES: ICD-10-CM

## 2025-07-23 DIAGNOSIS — M81.0 OSTEOPOROSIS OF MULTIPLE SITES: Primary | ICD-10-CM

## 2025-07-23 LAB
ALBUMIN SERPL BCG-MCNC: 4.4 G/DL (ref 3.5–5.2)
ALP SERPL-CCNC: 90 U/L (ref 40–150)
ALT SERPL W P-5'-P-CCNC: 17 U/L (ref 0–50)
ANION GAP SERPL CALCULATED.3IONS-SCNC: 11 MMOL/L (ref 7–15)
AST SERPL W P-5'-P-CCNC: 25 U/L (ref 0–45)
BILIRUB SERPL-MCNC: 0.4 MG/DL
BILIRUBIN DIRECT (ROCHE PRO & PURE): 0.18 MG/DL (ref 0–0.45)
BUN SERPL-MCNC: 18.8 MG/DL (ref 6–20)
CALCIUM SERPL-MCNC: 9.5 MG/DL (ref 8.8–10.4)
CHLORIDE SERPL-SCNC: 102 MMOL/L (ref 98–107)
CHOLEST SERPL-MCNC: 263 MG/DL
CREAT SERPL-MCNC: 0.77 MG/DL (ref 0.51–0.95)
EGFRCR SERPLBLD CKD-EPI 2021: >90 ML/MIN/1.73M2
ERYTHROCYTE [DISTWIDTH] IN BLOOD BY AUTOMATED COUNT: 12.9 % (ref 10–15)
EST. AVERAGE GLUCOSE BLD GHB EST-MCNC: 111 MG/DL
FASTING STATUS PATIENT QL REPORTED: YES
FASTING STATUS PATIENT QL REPORTED: YES
FERRITIN SERPL-MCNC: 33 NG/ML (ref 11–328)
GLUCOSE SERPL-MCNC: 94 MG/DL (ref 70–99)
HBA1C MFR BLD: 5.5 % (ref 0–5.6)
HCO3 SERPL-SCNC: 27 MMOL/L (ref 22–29)
HCT VFR BLD AUTO: 44.1 % (ref 35–47)
HDLC SERPL-MCNC: 100 MG/DL
HGB BLD-MCNC: 14 G/DL (ref 11.7–15.7)
LDLC SERPL CALC-MCNC: 149 MG/DL
MAGNESIUM SERPL-MCNC: 2.1 MG/DL (ref 1.7–2.3)
MCH RBC QN AUTO: 28.3 PG (ref 26.5–33)
MCHC RBC AUTO-ENTMCNC: 31.7 G/DL (ref 31.5–36.5)
MCV RBC AUTO: 89 FL (ref 78–100)
NONHDLC SERPL-MCNC: 163 MG/DL
PHOSPHATE SERPL-MCNC: 5.2 MG/DL (ref 2.5–4.5)
PLATELET # BLD AUTO: 349 10E3/UL (ref 150–450)
POTASSIUM SERPL-SCNC: 4.5 MMOL/L (ref 3.4–5.3)
PROT SERPL-MCNC: 7.2 G/DL (ref 6.4–8.3)
PTH-INTACT SERPL-MCNC: 50 PG/ML (ref 15–65)
RBC # BLD AUTO: 4.95 10E6/UL (ref 3.8–5.2)
SODIUM SERPL-SCNC: 140 MMOL/L (ref 135–145)
TRIGL SERPL-MCNC: 69 MG/DL
TSH SERPL DL<=0.005 MIU/L-ACNC: 1.8 UIU/ML (ref 0.3–4.2)
VIT D+METAB SERPL-MCNC: 38 NG/ML (ref 20–50)
WBC # BLD AUTO: 5.1 10E3/UL (ref 4–11)

## 2025-07-23 PROCEDURE — 84100 ASSAY OF PHOSPHORUS: CPT | Mod: ORL | Performed by: FAMILY MEDICINE

## 2025-07-23 PROCEDURE — 83970 ASSAY OF PARATHORMONE: CPT | Mod: ORL | Performed by: FAMILY MEDICINE

## 2025-07-23 PROCEDURE — 83735 ASSAY OF MAGNESIUM: CPT | Mod: ORL | Performed by: FAMILY MEDICINE

## 2025-07-23 PROCEDURE — 80053 COMPREHEN METABOLIC PANEL: CPT | Mod: ORL | Performed by: FAMILY MEDICINE

## 2025-07-23 PROCEDURE — 82248 BILIRUBIN DIRECT: CPT | Mod: ORL | Performed by: FAMILY MEDICINE

## 2025-07-23 PROCEDURE — 82728 ASSAY OF FERRITIN: CPT | Mod: ORL | Performed by: FAMILY MEDICINE

## 2025-07-23 PROCEDURE — 84443 ASSAY THYROID STIM HORMONE: CPT | Mod: ORL | Performed by: FAMILY MEDICINE

## 2025-07-23 PROCEDURE — 82306 VITAMIN D 25 HYDROXY: CPT | Mod: ORL | Performed by: FAMILY MEDICINE

## 2025-07-23 PROCEDURE — 80061 LIPID PANEL: CPT | Mod: ORL | Performed by: FAMILY MEDICINE

## 2025-07-24 ENCOUNTER — OFFICE VISIT (OUTPATIENT)
Dept: FAMILY MEDICINE | Facility: CLINIC | Age: 55
End: 2025-07-24
Payer: COMMERCIAL

## 2025-07-24 VITALS
OXYGEN SATURATION: 100 % | HEART RATE: 90 BPM | TEMPERATURE: 97.3 F | BODY MASS INDEX: 19.14 KG/M2 | WEIGHT: 112.08 LBS | SYSTOLIC BLOOD PRESSURE: 115 MMHG | DIASTOLIC BLOOD PRESSURE: 78 MMHG | HEIGHT: 64 IN

## 2025-07-24 DIAGNOSIS — Z98.890 S/P LEEP OF CERVIX: ICD-10-CM

## 2025-07-24 DIAGNOSIS — R79.89 SERUM PHOSPHATE ELEVATED: ICD-10-CM

## 2025-07-24 DIAGNOSIS — Z12.4 SCREENING FOR CERVICAL CANCER: ICD-10-CM

## 2025-07-24 DIAGNOSIS — Z83.3 FAMILY HISTORY OF TYPE 1 DIABETES MELLITUS: ICD-10-CM

## 2025-07-24 DIAGNOSIS — M81.0 OSTEOPOROSIS OF MULTIPLE SITES: ICD-10-CM

## 2025-07-24 DIAGNOSIS — Z00.00 ROUTINE GENERAL MEDICAL EXAMINATION AT A HEALTH CARE FACILITY: Primary | ICD-10-CM

## 2025-07-24 DIAGNOSIS — N95.8 GENITOURINARY SYNDROME OF MENOPAUSE: ICD-10-CM

## 2025-07-24 LAB — PHOSPHATE SERPL-MCNC: 3.8 MG/DL (ref 2.5–4.5)

## 2025-07-24 PROCEDURE — G0145 SCR C/V CYTO,THINLAYER,RESCR: HCPCS | Mod: ORL | Performed by: FAMILY MEDICINE

## 2025-07-24 PROCEDURE — 84100 ASSAY OF PHOSPHORUS: CPT | Mod: ORL | Performed by: FAMILY MEDICINE

## 2025-07-24 PROCEDURE — 87624 HPV HI-RISK TYP POOLED RSLT: CPT | Mod: ORL | Performed by: FAMILY MEDICINE

## 2025-07-24 RX ORDER — ESTRADIOL 0.1 MG/G
1 CREAM VAGINAL
Qty: 42.5 G | Refills: 3 | Status: SHIPPED | OUTPATIENT
Start: 2025-07-24

## 2025-07-24 NOTE — PATIENT INSTRUCTIONS
Goal of 1200 mg calcium per day (best through your diet)    Patient Education   Preventive Care Advice   This is general advice given by our system to help you stay healthy. However, your care team may have specific advice just for you. Please talk to your care team about your preventive care needs.  Nutrition  Eat 5 or more servings of fruits and vegetables each day.  Try wheat bread, brown rice and whole grain pasta (instead of white bread, rice, and pasta).  Get enough calcium and vitamin D. Check the label on foods and aim for 100% of the RDA (recommended daily allowance).  Lifestyle  Exercise at least 150 minutes each week  (30 minutes a day, 5 days a week).  Do muscle strengthening activities 2 days a week. These help control your weight and prevent disease.  No smoking.  Wear sunscreen to prevent skin cancer.  Have a dental exam and cleaning every 6 months.  Yearly exams  See your health care team every year to talk about:  Any changes in your health.  Any medicines your care team has prescribed.  Preventive care, family planning, and ways to prevent chronic diseases.  Shots (vaccines)   HPV shots (up to age 26), if you've never had them before.  Hepatitis B shots (up to age 59), if you've never had them before.  COVID-19 shot: Get this shot when it's due.  Flu shot: Get a flu shot every year.  Tetanus shot: Get a tetanus shot every 10 years.  Pneumococcal, hepatitis A, and RSV shots: Ask your care team if you need these based on your risk.  Shingles shot (for age 50 and up)  General health tests  Diabetes screening:  Starting at age 35, Get screened for diabetes at least every 3 years.  If you are younger than age 35, ask your care team if you should be screened for diabetes.  Cholesterol test: At age 39, start having a cholesterol test every 5 years, or more often if advised.  Bone density scan (DEXA): At age 50, ask your care team if you should have this scan for osteoporosis (brittle bones).  Hepatitis C:  Get tested at least once in your life.  STIs (sexually transmitted infections)  Before age 24: Ask your care team if you should be screened for STIs.  After age 24: Get screened for STIs if you're at risk. You are at risk for STIs (including HIV) if:  You are sexually active with more than one person.  You don't use condoms every time.  You or a partner was diagnosed with a sexually transmitted infection.  If you are at risk for HIV, ask about PrEP medicine to prevent HIV.  Get tested for HIV at least once in your life, whether you are at risk for HIV or not.  Cancer screening tests  Cervical cancer screening: If you have a cervix, begin getting regular cervical cancer screening tests starting at age 21.  Breast cancer scan (mammogram): If you've ever had breasts, begin having regular mammograms starting at age 40. This is a scan to check for breast cancer.  Colon cancer screening: It is important to start screening for colon cancer at age 45.  Have a colonoscopy test every 10 years (or more often if you're at risk) Or, ask your provider about stool tests like a FIT test every year or Cologuard test every 3 years.  To learn more about your testing options, visit:   .  For help making a decision, visit:   https://bit.ly/du95865.  Prostate cancer screening test: If you have a prostate, ask your care team if a prostate cancer screening test (PSA) at age 55 is right for you.  Lung cancer screening: If you are a current or former smoker ages 50 to 80, ask your care team if ongoing lung cancer screenings are right for you.  For informational purposes only. Not to replace the advice of your health care provider. Copyright   2023 [employer]. All rights reserved. Clinically reviewed by the [facility]. [employer] [id number] - REV 01/24.

## 2025-07-24 NOTE — PROGRESS NOTES
Preventive Care Visit  [facility]  Nirali Farias MD, Family Medicine  Jul 24, 2025      Assessment & Plan     Routine general medical examination at a health care facility    Osteoporosis of multiple sites  History of OP when nursing and pregnant, treated with Fosamax. Hasn't had a DEXA in years - earlier this month DEXA shows OP. Discussed treatment options - consider anabolic therapy such as Forteo or Prolia then transitioning to Reclast (has a hard time swallowing pills). No contraindications. Advised to schedule dental exam. Will refer to [facility] pharmacy to discuss options in more detail and work through insurance coverage.   - Phosphorus; Future  - Med Therapy Management Referral  - Phosphorus    S/P LEEP of cervix  Screening for cervical cancer  LEEP in 1997.   - HPV and Gynecologic Cytology Panel - Recommended Age 30 - 65 Years    Serum phosphate elevated  Recheck level today - all other labs unremarkable.   - Phosphorus; Future  - Phosphorus    Family history of type 1 diabetes mellitus  Check autoantibodies.   - Islet Antigen (IA-2) Autoantibody, Serum; Future  - Islet Antigen (IA-2) Autoantibody, Serum    Genitourinary syndrome of menopause  Trial of Estrace cream.   - estradiol (ESTRACE) 0.1 MG/GM vaginal cream; Place 1 g vaginally twice a week.    Counseling  Appropriate preventive services were addressed with this patient via screening, questionnaire, or discussion as appropriate for fall prevention, nutrition, physical activity, Tobacco-use cessation, social engagement, weight loss and cognition.  Checklist reviewing preventive services available has been given to the patient.  Reviewed patient's diet, addressing concerns and/or questions.     Follow-up  Return in about 53 weeks (around 7/30/2026) for Annual Wellness Visit.    Bakari Boateng is a 55 year old, presenting for the following:  Establish Care (Osteoporsis)         HPI  History of low bone density diagnosed in her 30s after a  fracture (nursing her second child, while she was pregnant with 3rd child) - fracture was not healing so she had DEXA  Took liquid Fosamax for a few years (has a hard time swallowing pills)  Lifting weights 3x/week             7/21/2025   General Health   How would you rate your overall physical health? Excellent   Feel stress (tense, anxious, or unable to sleep) Not at all         7/21/2025   Nutrition   Three or more servings of calcium each day? (!) I DON'T KNOW   Diet: Regular (no restrictions)   How many servings of fruit and vegetables per day? (!) 2-3   How many sweetened beverages each day? 0-1         7/21/2025   Exercise   Days per week of moderate/strenous exercise 4 days   Average minutes spent exercising at this level 20 min         7/21/2025   Social Factors   Frequency of gathering with friends or relatives Once a week   Worry food won't last until get money to buy more No    No   Food not last or not have enough money for food? No    No   Do you have housing? (Housing is defined as stable permanent housing and does not include staying outside in a car, in a tent, in an abandoned building, in an overnight shelter, or couch-surfing.) Yes    Yes   Are you worried about losing your housing? No    No   Lack of transportation? No    No   Unable to get utilities (heat,electricity)? No    No       Multiple values from one day are sorted in reverse-chronological order         7/21/2025   Fall Risk   Fallen 2 or more times in the past year? No   Trouble with walking or balance? No          7/21/2025   Dental   Dentist two times every year? Yes       Today's PHQ-2 Score:       7/24/2025    10:11 AM   PHQ-2 ( 1999 [employer])   Q1: Little interest or pleasure in doing things 0   Q2: Feeling down, depressed or hopeless 0   PHQ-2 Score 0    Q1: Little interest or pleasure in doing things Not at all   Q2: Feeling down, depressed or hopeless Not at all   PHQ-2 Score 0       Patient-reported         7/21/2025   Substance  "Use   Alcohol more than 3/day or more than 7/wk No   Do you use any other substances recreationally? No     Social History     Tobacco Use    Smoking status: Never     Passive exposure: Never    Smokeless tobacco: Never   Vaping Use    Vaping status: Never Used   Substance Use Topics    Alcohol use: Yes     Comment: socially    Drug use: No           5/1/2025   LAST FHS-7 RESULTS   1st degree relative breast or ovarian cancer No   Any relative bilateral breast cancer No   Any male have breast cancer No   Any ONE woman have BOTH breast AND ovarian cancer No   Any woman with breast cancer before 50yrs No   2 or more relatives with breast AND/OR ovarian cancer No   2 or more relatives with breast AND/OR bowel cancer No        Mammogram Screening - Mammogram every 1-2 years updated in Health Maintenance based on mutual decision making        7/21/2025   STI Screening   New sexual partner(s) since last STI/HIV test? No     History of abnormal Pap smear: YES - reflected in Problem List and Health Maintenance accordingly        Latest Ref Rng & Units 3/14/2022    12:02 PM 5/27/2014    12:00 AM   PAP / HPV   PAP  Negative for Intraepithelial Lesion or Malignancy (NILM)     PAP (Historical)   OTHER-NIL, See Result    HPV 16 DNA Negative Negative     HPV 18 DNA Negative Negative     Other HR HPV Negative Negative       ASCVD Risk   The 10-year ASCVD risk score (Charles DK, et al., 2019) is: 1.2%    Values used to calculate the score:      Age: 55 years      Sex: Female      Is Non- : No      Diabetic: No      Tobacco smoker: No      Systolic Blood Pressure: 115 mmHg      Is BP treated: No      HDL Cholesterol: 100 mg/dL      Total Cholesterol: 263 mg/dL         Reviewed and updated as needed this visit by Provider    Allergies    Med Hx  Surg Hx  Fam Hx               Objective    Exam  /78   Pulse 90   Temp 97.3  F (36.3  C)   Ht 1.627 m (5' 4.06\")   Wt 50.8 kg (112 lb 1.3 oz)   " SpO2 100%   BMI 19.21 kg/m       Physical Exam  GENERAL: alert and no distress  EYES: Eyes grossly normal to inspection, PERRL and conjunctivae and sclerae normal  HENT: ear canals and TM's normal, nose and mouth without ulcers or lesions  NECK: no adenopathy, no asymmetry, masses, or scars  RESP: lungs clear to auscultation - no rales, rhonchi or wheezes  CV: regular rate and rhythm, normal S1 S2, no S3 or S4, no murmur, click or rub, no peripheral edema  ABDOMEN: soft, nontender, no hepatosplenomegaly, no masses and bowel sounds normal   (female) w/bimanual: normal female external genitalia, normal urethral meatus, normal vaginal mucosa, and normal cervix/adnexa/uterus without masses or discharge  MS: no gross musculoskeletal defects noted, no edema  SKIN: no suspicious lesions or rashes  NEURO: Normal strength and tone, mentation intact and speech normal  PSYCH: mentation appears normal, affect normal/bright      Signed Electronically by: iNrali Farias MD

## 2025-07-29 LAB
BKR AP ASSOCIATED HPV REPORT: NORMAL
BKR LAB AP GYN ADEQUACY: NORMAL
BKR LAB AP GYN INTERPRETATION: NORMAL
BKR LAB AP PREVIOUS ABNORMAL: NORMAL
PATH REPORT.COMMENTS IMP SPEC: NORMAL
PATH REPORT.COMMENTS IMP SPEC: NORMAL
PATH REPORT.RELEVANT HX SPEC: NORMAL

## 2025-07-30 LAB — ISLET CELL512 AB SER IA-ACNC: <5.4 U/ML

## 2025-08-07 ENCOUNTER — LAB REQUISITION (OUTPATIENT)
Dept: LAB | Facility: CLINIC | Age: 55
End: 2025-08-07
Payer: COMMERCIAL

## 2025-08-07 DIAGNOSIS — D36.16 BENIGN NEOPLASM OF PERIPHERAL NERVES AND AUTONOMIC NERVOUS SYSTEM OF PELVIS: ICD-10-CM

## 2025-08-07 PROCEDURE — 88305 TISSUE EXAM BY PATHOLOGIST: CPT | Mod: 26 | Performed by: DERMATOLOGY

## 2025-08-07 PROCEDURE — 88342 IMHCHEM/IMCYTCHM 1ST ANTB: CPT | Mod: TC,ORL | Performed by: STUDENT IN AN ORGANIZED HEALTH CARE EDUCATION/TRAINING PROGRAM

## 2025-08-07 PROCEDURE — 88342 IMHCHEM/IMCYTCHM 1ST ANTB: CPT | Mod: 26 | Performed by: DERMATOLOGY

## 2025-08-14 LAB
PATH REPORT.COMMENTS IMP SPEC: NORMAL
PATH REPORT.COMMENTS IMP SPEC: NORMAL
PATH REPORT.FINAL DX SPEC: NORMAL
PATH REPORT.GROSS SPEC: NORMAL
PATH REPORT.MICROSCOPIC SPEC OTHER STN: NORMAL
PATH REPORT.RELEVANT HX SPEC: NORMAL

## (undated) RX ORDER — NITROGLYCERIN 0.4 MG/1
TABLET SUBLINGUAL
Status: DISPENSED
Start: 2021-08-16

## (undated) RX ORDER — IVABRADINE 5 MG/1
TABLET, FILM COATED ORAL
Status: DISPENSED
Start: 2021-08-16

## (undated) RX ORDER — DIPHENHYDRAMINE HCL 25 MG
CAPSULE ORAL
Status: DISPENSED
Start: 2021-08-16

## (undated) RX ORDER — METOPROLOL TARTRATE 1 MG/ML
INJECTION, SOLUTION INTRAVENOUS
Status: DISPENSED
Start: 2021-08-16

## (undated) RX ORDER — DIPHENHYDRAMINE HYDROCHLORIDE 50 MG/ML
INJECTION INTRAMUSCULAR; INTRAVENOUS
Status: DISPENSED
Start: 2021-08-16

## (undated) RX ORDER — METHYLPREDNISOLONE SODIUM SUCCINATE 125 MG/2ML
INJECTION, POWDER, LYOPHILIZED, FOR SOLUTION INTRAMUSCULAR; INTRAVENOUS
Status: DISPENSED
Start: 2021-08-16